# Patient Record
Sex: FEMALE | Race: WHITE | NOT HISPANIC OR LATINO
[De-identification: names, ages, dates, MRNs, and addresses within clinical notes are randomized per-mention and may not be internally consistent; named-entity substitution may affect disease eponyms.]

---

## 2019-05-08 ENCOUNTER — APPOINTMENT (OUTPATIENT)
Dept: FAMILY MEDICINE | Facility: CLINIC | Age: 39
End: 2019-05-08
Payer: COMMERCIAL

## 2019-05-08 VITALS
HEART RATE: 69 BPM | RESPIRATION RATE: 16 BRPM | WEIGHT: 141 LBS | DIASTOLIC BLOOD PRESSURE: 64 MMHG | SYSTOLIC BLOOD PRESSURE: 96 MMHG | OXYGEN SATURATION: 99 % | BODY MASS INDEX: 22.66 KG/M2 | HEIGHT: 66 IN

## 2019-05-08 DIAGNOSIS — Z82.49 FAMILY HISTORY OF ISCHEMIC HEART DISEASE AND OTHER DISEASES OF THE CIRCULATORY SYSTEM: ICD-10-CM

## 2019-05-08 DIAGNOSIS — F98.8 OTHER SPECIFIED BEHAVIORAL AND EMOTIONAL DISORDERS WITH ONSET USUALLY OCCURRING IN CHILDHOOD AND ADOLESCENCE: ICD-10-CM

## 2019-05-08 LAB — CYTOLOGY CVX/VAG DOC THIN PREP: NORMAL

## 2019-05-08 PROCEDURE — 99203 OFFICE O/P NEW LOW 30 MIN: CPT

## 2019-05-08 NOTE — HISTORY OF PRESENT ILLNESS
[de-identified] : 38 year old female here to establish care and for complaints of ADD\par Patient with long history of ADD, used to be treated successfully with Ritalin, however cannot find neurologist, so will take care over care. The patients complete medical, family and social history was documented and reviewed with the patient.  The patients medications were identified and also reviewed with the patient as well as any allergies to any medications. All active and previous medical problems were identified and discussed with the patient.

## 2019-05-08 NOTE — ASSESSMENT
[FreeTextEntry1] : Discussed diagnosis of ADD with patient. Patient was assessed using verbal scale and in depth discussion of behaviors and how they are impacting daily life. Discussed all side affects/pros/cons of medications and black box warnings. Patient was educated on addictive potential of medications.  Patient verbalized understanding and will take medications as prescribed.  All questions were answered.\par \par was controlled well on ritalin in past \par will take over care\par \par

## 2019-05-08 NOTE — HEALTH RISK ASSESSMENT
[Good] : ~his/her~  mood as  good [No falls in past year] : Patient reported no falls in the past year [0] : 1) Little interest or pleasure doing things: Not at all (0) [Hepatitis C test declined] : Hepatitis C test declined [HIV test declined] : HIV test declined [With Family] : lives with family [Employed] : employed [With Significant Other] : lives with significant other [# Of Children ___] : has [unfilled] children [] :  [Fully functional (bathing, dressing, toileting, transferring, walking, feeding)] : Fully functional (bathing, dressing, toileting, transferring, walking, feeding) [Smoke Detector] : smoke detector [Fully functional (using the telephone, shopping, preparing meals, housekeeping, doing laundry, using] : Fully functional and needs no help or supervision to perform IADLs (using the telephone, shopping, preparing meals, housekeeping, doing laundry, using transportation, managing medications and managing finances) [Seat Belt] :  uses seat belt [] : No [[id number]] : 0

## 2020-01-02 ENCOUNTER — APPOINTMENT (OUTPATIENT)
Dept: FAMILY MEDICINE | Facility: CLINIC | Age: 40
End: 2020-01-02
Payer: COMMERCIAL

## 2020-01-02 VITALS
RESPIRATION RATE: 18 BRPM | WEIGHT: 145 LBS | OXYGEN SATURATION: 98 % | HEIGHT: 66 IN | SYSTOLIC BLOOD PRESSURE: 84 MMHG | DIASTOLIC BLOOD PRESSURE: 52 MMHG | HEART RATE: 93 BPM | BODY MASS INDEX: 23.3 KG/M2

## 2020-01-02 PROCEDURE — 99213 OFFICE O/P EST LOW 20 MIN: CPT

## 2020-01-06 ENCOUNTER — APPOINTMENT (OUTPATIENT)
Dept: FAMILY MEDICINE | Facility: CLINIC | Age: 40
End: 2020-01-06
Payer: COMMERCIAL

## 2020-01-06 VITALS
SYSTOLIC BLOOD PRESSURE: 102 MMHG | TEMPERATURE: 98.4 F | OXYGEN SATURATION: 99 % | HEART RATE: 69 BPM | DIASTOLIC BLOOD PRESSURE: 70 MMHG

## 2020-01-06 LAB — CYTOLOGY CVX/VAG DOC THIN PREP: NORMAL

## 2020-01-06 PROCEDURE — 99213 OFFICE O/P EST LOW 20 MIN: CPT

## 2020-01-06 NOTE — PHYSICAL EXAM
[Well Nourished] : well nourished [Clear to Auscultation] : lungs were clear to auscultation bilaterally [Regular Rhythm] : with a regular rhythm [Normal S1, S2] : normal S1 and S2 [Normal Affect] : the affect was normal [Normal Gait] : normal gait [Normal Insight/Judgement] : insight and judgment were intact

## 2020-01-06 NOTE — ASSESSMENT
[FreeTextEntry1] : uri\par failed azithromyizin\par was given azithromycin, prednisone and omeprazole with no issues\par singulair\par flonase\par promethazine dm\par augmentin\par \par Discussed diagnosis of ADD with patient. Patient was assessed using verbal scale and in depth discussion of behaviors and how they are impacting daily life. Discussed all side affects/pros/cons of medications and black box warnings. Patient was educated on addictive potential of medications.  Patient verbalized understanding and will take medications as prescribed.  All questions were answered.\par \par was controlled well on ritalin in past \par will take over care\par \par

## 2020-01-06 NOTE — HISTORY OF PRESENT ILLNESS
[de-identified] : 39 year old female here with complaints of cough, congestion\par was given prednisone and omeprazole, still coughing\par . Patients active medications, allergies and issues were all reviewed with the patient at time of visit.\par

## 2020-01-06 NOTE — HEALTH RISK ASSESSMENT
[No falls in past year] : Patient reported no falls in the past year [] : No [0] : 1) Little interest or pleasure doing things: Not at all (0) [[id number]] : 0 [Good] : ~his/her~  mood as  good [HIV test declined] : HIV test declined [Hepatitis C test declined] : Hepatitis C test declined [With Family] : lives with family [With Significant Other] : lives with significant other [Employed] : employed [# Of Children ___] : has [unfilled] children [] :  [Fully functional (bathing, dressing, toileting, transferring, walking, feeding)] : Fully functional (bathing, dressing, toileting, transferring, walking, feeding) [Smoke Detector] : smoke detector [Fully functional (using the telephone, shopping, preparing meals, housekeeping, doing laundry, using] : Fully functional and needs no help or supervision to perform IADLs (using the telephone, shopping, preparing meals, housekeeping, doing laundry, using transportation, managing medications and managing finances) [Seat Belt] :  uses seat belt

## 2020-01-25 ENCOUNTER — RX RENEWAL (OUTPATIENT)
Age: 40
End: 2020-01-25

## 2020-04-07 ENCOUNTER — APPOINTMENT (OUTPATIENT)
Dept: FAMILY MEDICINE | Facility: CLINIC | Age: 40
End: 2020-04-07
Payer: COMMERCIAL

## 2020-04-07 VITALS
HEIGHT: 66 IN | OXYGEN SATURATION: 98 % | DIASTOLIC BLOOD PRESSURE: 68 MMHG | SYSTOLIC BLOOD PRESSURE: 100 MMHG | HEART RATE: 88 BPM

## 2020-04-07 PROCEDURE — 99213 OFFICE O/P EST LOW 20 MIN: CPT | Mod: 25

## 2020-04-07 PROCEDURE — 36415 COLL VENOUS BLD VENIPUNCTURE: CPT

## 2020-04-07 RX ORDER — AZITHROMYCIN 250 MG/1
250 TABLET, FILM COATED ORAL
Qty: 6 | Refills: 0 | Status: COMPLETED | COMMUNITY
Start: 2019-12-30

## 2020-04-07 RX ORDER — AMOXICILLIN AND CLAVULANATE POTASSIUM 875; 125 MG/1; MG/1
875-125 TABLET, COATED ORAL
Qty: 20 | Refills: 0 | Status: DISCONTINUED | COMMUNITY
Start: 2020-01-06 | End: 2020-04-07

## 2020-04-07 RX ORDER — PREDNISONE 10 MG/1
10 TABLET ORAL
Qty: 10 | Refills: 0 | Status: COMPLETED | COMMUNITY
Start: 2019-12-30

## 2020-04-07 NOTE — PHYSICAL EXAM
[Well Nourished] : well nourished [Clear to Auscultation] : lungs were clear to auscultation bilaterally [Regular Rhythm] : with a regular rhythm [Normal S1, S2] : normal S1 and S2 [Normal Gait] : normal gait [Normal Affect] : the affect was normal [Normal Insight/Judgement] : insight and judgment were intact [de-identified] : palpable nodule on right side of throat on thyroid

## 2020-04-07 NOTE — ASSESSMENT
[FreeTextEntry1] : thyroid nodule\par sent for us, reviewed with patient\par 3 cm dominant nodule\par sending for us guided biopsy\par will make appointment\par \par check thyroid levels\par

## 2020-04-07 NOTE — HEALTH RISK ASSESSMENT
[No falls in past year] : Patient reported no falls in the past year [0] : 2) Feeling down, depressed, or hopeless: Not at all (0) [Good] : ~his/her~  mood as  good [HIV test declined] : HIV test declined [Hepatitis C test declined] : Hepatitis C test declined [With Significant Other] : lives with significant other [With Family] : lives with family [Employed] : employed [] :  [# Of Children ___] : has [unfilled] children [Fully functional (bathing, dressing, toileting, transferring, walking, feeding)] : Fully functional (bathing, dressing, toileting, transferring, walking, feeding) [Fully functional (using the telephone, shopping, preparing meals, housekeeping, doing laundry, using] : Fully functional and needs no help or supervision to perform IADLs (using the telephone, shopping, preparing meals, housekeeping, doing laundry, using transportation, managing medications and managing finances) [Smoke Detector] : smoke detector [Seat Belt] :  uses seat belt [] : No [[id number]] : 0

## 2020-04-08 LAB
T3 SERPL-MCNC: 95 NG/DL
T4 FREE SERPL-MCNC: 1.2 NG/DL
TSH SERPL-ACNC: 1.72 UIU/ML

## 2020-04-09 ENCOUNTER — OUTPATIENT (OUTPATIENT)
Dept: OUTPATIENT SERVICES | Facility: HOSPITAL | Age: 40
LOS: 1 days | End: 2020-04-09
Payer: COMMERCIAL

## 2020-04-09 ENCOUNTER — RESULT REVIEW (OUTPATIENT)
Age: 40
End: 2020-04-09

## 2020-04-09 ENCOUNTER — APPOINTMENT (OUTPATIENT)
Dept: ULTRASOUND IMAGING | Facility: IMAGING CENTER | Age: 40
End: 2020-04-09
Payer: COMMERCIAL

## 2020-04-09 DIAGNOSIS — E04.1 NONTOXIC SINGLE THYROID NODULE: ICD-10-CM

## 2020-04-09 PROCEDURE — 88172 CYTP DX EVAL FNA 1ST EA SITE: CPT

## 2020-04-09 PROCEDURE — 10005 FNA BX W/US GDN 1ST LES: CPT

## 2020-04-09 PROCEDURE — 88173 CYTOPATH EVAL FNA REPORT: CPT | Mod: 26

## 2020-04-09 PROCEDURE — 88173 CYTOPATH EVAL FNA REPORT: CPT

## 2020-04-22 ENCOUNTER — OUTPATIENT (OUTPATIENT)
Dept: OUTPATIENT SERVICES | Facility: HOSPITAL | Age: 40
LOS: 1 days | End: 2020-04-22
Payer: COMMERCIAL

## 2020-04-22 ENCOUNTER — APPOINTMENT (OUTPATIENT)
Dept: ULTRASOUND IMAGING | Facility: IMAGING CENTER | Age: 40
End: 2020-04-22
Payer: COMMERCIAL

## 2020-04-22 DIAGNOSIS — C80.1 MALIGNANT (PRIMARY) NEOPLASM, UNSPECIFIED: ICD-10-CM

## 2020-04-22 PROCEDURE — 76536 US EXAM OF HEAD AND NECK: CPT

## 2020-04-22 PROCEDURE — 76536 US EXAM OF HEAD AND NECK: CPT | Mod: 26

## 2020-04-24 ENCOUNTER — APPOINTMENT (OUTPATIENT)
Dept: ENDOCRINOLOGY | Facility: CLINIC | Age: 40
End: 2020-04-24
Payer: COMMERCIAL

## 2020-04-24 PROCEDURE — 99204 OFFICE O/P NEW MOD 45 MIN: CPT | Mod: 95

## 2020-04-24 NOTE — CONSULT LETTER
[Dear  ___] : Dear  [unfilled], [Sincerely,] : Sincerely, [FreeTextEntry3] : Valerie Juarez MD, FACE, ECNU\par  [FreeTextEntry1] : Thank you for referring  Ms. RENU ARNETT to me for evaluation and treatment. Please, see attached consultation note. As always, if there are specific questions you would like to discuss, please feel free to contact me.\par Thank you for the courtesy of this evaluation.\par

## 2020-04-24 NOTE — HISTORY OF PRESENT ILLNESS
[Medical Office: ([facility])___] : at the medical office located in  [Home] : at home, [unfilled] , at the time of the visit. [Patient] : the patient [Self] : self [FreeTextEntry1] : 39 year female  referred for management of thyroid cancer. \par Mrs Hobson has accidentally discovered a "lump" on the right side of her neck. Subsequent thyroid sonogram showed a dominant right sided nodule. FNA (+) PTC.\par She denies history of neck surgery or radiation exposure to the neck area other than radiologic studies. \par Family history is negative for thyroid illness. \par Currently, there are no local neck symptoms of anterior neck pain or problems with breathing, speaking, or swallowing. \par Patient denies symptoms of hypothyroidism or hyperthyroidism. \par Labs: TSH- 1.7\par Thyroid US (4/7/20)- RMLP solid calcified 3.0x1.3x2.1 nodule, RUP 0.3x0.2x0.3\par FNAB (4/9/20) of the dominant right sided nodule - PTC ([place] [facility])\par \par Repeat Neck US (4/22/20) - bilateral level 2-3 LN with overall benign morphology\par \par

## 2020-04-24 NOTE — REASON FOR VISIT
[Thyroid Cancer] : thyroid cancer [Consultation] : a consultation visit [Thyroid nodule/ MNG] : thyroid nodule/ MNG

## 2020-04-24 NOTE — ASSESSMENT
[FreeTextEntry1] : Current approaches to thyroid cancer management were discussed with patient in details. \par - patient will see Dr. Vargas next week. \par - we discussed options for lobectomy vs total thyroidectomy, but at present, I'd advocate a total thyroidectomy +/- central node dissection\par - We discussed possible need  for Whole Body Scan (rh-TSH vs THW) about 4-6 weeks post surgery; that might follow by I-131 remnant ablation therapy\par - Low-iodine diet is reviewed and [email or web address] information is provided \par - Levothyroxine suppressive therapy as well as Tg and Tg a/b monitoring were reviewed \par - Thyroid bed/anterior neck US ~ 6 months post surgery \par RTC ~ 2-3 weeks post surgery\par \par

## 2020-04-29 ENCOUNTER — TRANSCRIPTION ENCOUNTER (OUTPATIENT)
Age: 40
End: 2020-04-29

## 2020-04-29 ENCOUNTER — APPOINTMENT (OUTPATIENT)
Dept: SURGERY | Facility: CLINIC | Age: 40
End: 2020-04-29
Payer: COMMERCIAL

## 2020-04-29 DIAGNOSIS — C80.1 MALIGNANT (PRIMARY) NEOPLASM, UNSPECIFIED: ICD-10-CM

## 2020-04-29 PROCEDURE — 99203 OFFICE O/P NEW LOW 30 MIN: CPT | Mod: 95

## 2020-04-29 NOTE — PHYSICAL EXAM
[Midline] : located in midline position [de-identified] : Extremities: VILLALOBOS x 4.   Skin: No obvious skin lesions.   Voice: clear [Normal] : orientation to person, place, and time: normal

## 2020-04-29 NOTE — REASON FOR VISIT
[Initial Consultation] : an initial consultation for [FreeTextEntry2] : a newly diagnosed thyroid cancer

## 2020-05-26 ENCOUNTER — NON-APPOINTMENT (OUTPATIENT)
Age: 40
End: 2020-05-26

## 2020-05-29 ENCOUNTER — RESULT REVIEW (OUTPATIENT)
Age: 40
End: 2020-05-29

## 2020-05-29 ENCOUNTER — OUTPATIENT (OUTPATIENT)
Dept: OUTPATIENT SERVICES | Facility: HOSPITAL | Age: 40
LOS: 1 days | End: 2020-05-29
Payer: COMMERCIAL

## 2020-05-29 VITALS
WEIGHT: 145.06 LBS | RESPIRATION RATE: 14 BRPM | TEMPERATURE: 99 F | OXYGEN SATURATION: 97 % | HEIGHT: 66 IN | SYSTOLIC BLOOD PRESSURE: 111 MMHG | DIASTOLIC BLOOD PRESSURE: 72 MMHG | HEART RATE: 76 BPM

## 2020-05-29 DIAGNOSIS — Z01.818 ENCOUNTER FOR OTHER PREPROCEDURAL EXAMINATION: ICD-10-CM

## 2020-05-29 DIAGNOSIS — C73 MALIGNANT NEOPLASM OF THYROID GLAND: ICD-10-CM

## 2020-05-29 PROCEDURE — 88321 CONSLTJ&REPRT SLD PREP ELSWR: CPT

## 2020-05-29 NOTE — H&P PST ADULT - ASSESSMENT
40 yo female scheduled for Total Thyroidectomy w/ Central Neck Dissection - [facility] Monitor on 6/4/20 with Dr Vargas.

## 2020-05-29 NOTE — H&P PST ADULT - CONSTITUTIONAL COMMENTS
Denies any changes in her health since she had telephone history interview - COVID-19 Swab obtained on 6/3/2020= negative

## 2020-05-29 NOTE — H&P PST ADULT - NEGATIVE ENMT SYMPTOMS
no nasal congestion/no throat pain/no dysphagia/no ear pain/no tinnitus/no nasal discharge/no vertigo/no sinus symptoms

## 2020-05-29 NOTE — H&P PST ADULT - NEGATIVE NEUROLOGICAL SYMPTOMS
no generalized seizures/no paresthesias/no vertigo/no focal seizures/no loss of sensation/no weakness

## 2020-05-29 NOTE — H&P PST ADULT - ATTENDING COMMENTS
ADDENDUM 6/4/2020:  Due to COVID-19 protocol;  Pt seen in holding room for physical exam /completion of PST H&P. Pt awake, alert and oriented X 3. VS as charted. Procedure verified with patient.  COVID-19 Swab obtained on 6/3/2020 = Non Detected.  Pt denies any changes in her health since  telephone interview. Physical exam as charted. Medical clearance on chart. Pt optimized to proceed for scheduled procedure; Total Thyroidectomy W/Central Neck Dissection - [facility] Monitor with Dr Adin Vargas. Sunni Friend ANP-C

## 2020-05-29 NOTE — H&P PST ADULT - NSICDXPASTMEDICALHX_GEN_ALL_CORE_FT
PAST MEDICAL HISTORY:  Acute asthmatic bronchitis November 2019 - January 2020    Asthmatic bronchitis     Attention deficit disorder (ADD) in adult     Malignant neoplasm of thyroid gland     Ocular migraine last episode 5/28/20  previous very long time ago    Thyroid nodule

## 2020-05-29 NOTE — H&P PST ADULT - NSANTHOSAYNRD_GEN_A_CORE
No. CHACHO screening performed.  STOP BANG Legend: 0-2 = LOW Risk; 3-4 = INTERMEDIATE Risk; 5-8 = HIGH Risk

## 2020-05-29 NOTE — H&P PST ADULT - HISTORY OF PRESENT ILLNESS
38 yo female [facility].  Patient states that in April 2020 she had an abnormal neck exam at her doctor's office.   Patient had an Ultrasound and Biopsy of Thyroid, followed up with Endocrinologist and surgeon. Neck lump is currently unchanged in size.

## 2020-05-29 NOTE — H&P PST ADULT - BLOOD AVOIDANCE/RESTRICTIONS, PROFILE
Former smoker quit about 15 years ago.  At least two episodes of hemoptysis over the last two days.  Consult Pulmonary.  CT chest/abdomen concerning for metastatic disease (new)     none

## 2020-05-29 NOTE — H&P PST ADULT - NSICDXPROBLEM_GEN_ALL_CORE_FT
PROBLEM DIAGNOSES  Problem: Malignant neoplasm of thyroid gland  Assessment and Plan: check labs cbcc cmp pt ptt ucg and ekg  discussed with pt hat she needs to have lab tests and ekg done ant her doctor's office have results faxed to [facility]   medical clearance  called Dr Martin office s/w [facility]  requested that blood work cbc cmp pt ptt hcg and ekg be done at the doctor's office with results faxed to [facility]  patient is aware that she will need to have covid swab test done at [facility],  [place] will call for an appointment   preop instructions were given  pativerbalizes understanding of instructions PROBLEM DIAGNOSES  Problem: Malignant neoplasm of thyroid gland  Assessment and Plan: Due to COVID-19 protocol PST History obtained via telephone - medical clearance scheduled - pt to have blood work done by PCP - COVID-19 swab to be done at [facility] on 6/3/2020. Physical exam  for completion of H&P will be obtained in holding room prior to procedure. Pre-op instructions given to pt with understanding verbalized

## 2020-06-01 PROBLEM — C73 MALIGNANT NEOPLASM OF THYROID GLAND: Chronic | Status: ACTIVE | Noted: 2020-05-29

## 2020-06-01 PROBLEM — J45.909 UNSPECIFIED ASTHMA, UNCOMPLICATED: Chronic | Status: ACTIVE | Noted: 2020-05-29

## 2020-06-01 PROBLEM — F98.8 OTHER SPECIFIED BEHAVIORAL AND EMOTIONAL DISORDERS WITH ONSET USUALLY OCCURRING IN CHILDHOOD AND ADOLESCENCE: Chronic | Status: ACTIVE | Noted: 2020-05-29

## 2020-06-01 PROBLEM — E04.1 NONTOXIC SINGLE THYROID NODULE: Chronic | Status: ACTIVE | Noted: 2020-05-29

## 2020-06-01 PROBLEM — G43.109 MIGRAINE WITH AURA, NOT INTRACTABLE, WITHOUT STATUS MIGRAINOSUS: Chronic | Status: ACTIVE | Noted: 2020-05-29

## 2020-06-02 ENCOUNTER — APPOINTMENT (OUTPATIENT)
Dept: FAMILY MEDICINE | Facility: CLINIC | Age: 40
End: 2020-06-02
Payer: COMMERCIAL

## 2020-06-02 ENCOUNTER — LABORATORY RESULT (OUTPATIENT)
Age: 40
End: 2020-06-02

## 2020-06-02 ENCOUNTER — NON-APPOINTMENT (OUTPATIENT)
Age: 40
End: 2020-06-02

## 2020-06-02 VITALS
BODY MASS INDEX: 23.3 KG/M2 | DIASTOLIC BLOOD PRESSURE: 68 MMHG | HEART RATE: 73 BPM | SYSTOLIC BLOOD PRESSURE: 100 MMHG | OXYGEN SATURATION: 98 % | HEIGHT: 66 IN | WEIGHT: 145 LBS

## 2020-06-02 DIAGNOSIS — Z01.818 ENCOUNTER FOR OTHER PREPROCEDURAL EXAMINATION: ICD-10-CM

## 2020-06-02 DIAGNOSIS — Z11.59 ENCOUNTER FOR SCREENING FOR OTHER VIRAL DISEASES: ICD-10-CM

## 2020-06-02 PROCEDURE — 93000 ELECTROCARDIOGRAM COMPLETE: CPT

## 2020-06-02 PROCEDURE — 99214 OFFICE O/P EST MOD 30 MIN: CPT | Mod: 25

## 2020-06-02 PROCEDURE — 36415 COLL VENOUS BLD VENIPUNCTURE: CPT

## 2020-06-02 RX ORDER — PREDNISONE 20 MG/1
20 TABLET ORAL
Qty: 18 | Refills: 0 | Status: DISCONTINUED | COMMUNITY
Start: 2020-01-02 | End: 2020-06-02

## 2020-06-02 RX ORDER — PROMETHAZINE HYDROCHLORIDE AND DEXTROMETHORPHAN HYDROBROMIDE ORAL SOLUTION 15; 6.25 MG/5ML; MG/5ML
6.25-15 SOLUTION ORAL
Qty: 150 | Refills: 0 | Status: DISCONTINUED | COMMUNITY
Start: 2020-01-06 | End: 2020-06-02

## 2020-06-02 RX ORDER — METHYLPHENIDATE HYDROCHLORIDE 10 MG/1
10 CAPSULE, EXTENDED RELEASE ORAL
Qty: 30 | Refills: 0 | Status: DISCONTINUED | COMMUNITY
Start: 2019-05-08 | End: 2020-06-02

## 2020-06-03 ENCOUNTER — OUTPATIENT (OUTPATIENT)
Dept: OUTPATIENT SERVICES | Facility: HOSPITAL | Age: 40
LOS: 1 days | End: 2020-06-03
Payer: COMMERCIAL

## 2020-06-03 ENCOUNTER — TRANSCRIPTION ENCOUNTER (OUTPATIENT)
Age: 40
End: 2020-06-03

## 2020-06-03 DIAGNOSIS — C73 MALIGNANT NEOPLASM OF THYROID GLAND: ICD-10-CM

## 2020-06-03 DIAGNOSIS — Z11.59 ENCOUNTER FOR SCREENING FOR OTHER VIRAL DISEASES: ICD-10-CM

## 2020-06-03 LAB
ALBUMIN SERPL ELPH-MCNC: 4.5 G/DL
ALP BLD-CCNC: 88 U/L
ALT SERPL-CCNC: 15 U/L
ANION GAP SERPL CALC-SCNC: 14 MMOL/L
APPEARANCE: ABNORMAL
APTT BLD: 28.4 SEC
AST SERPL-CCNC: 14 U/L
BASOPHILS # BLD AUTO: 0.07 K/UL
BASOPHILS NFR BLD AUTO: 1.2 %
BILIRUB SERPL-MCNC: <0.2 MG/DL
BILIRUBIN URINE: NEGATIVE
BLOOD URINE: NEGATIVE
BUN SERPL-MCNC: 10 MG/DL
CALCIUM SERPL-MCNC: 9.4 MG/DL
CHLORIDE SERPL-SCNC: 105 MMOL/L
CO2 SERPL-SCNC: 21 MMOL/L
COLOR: YELLOW
CREAT SERPL-MCNC: 0.74 MG/DL
EOSINOPHIL # BLD AUTO: 0.11 K/UL
EOSINOPHIL NFR BLD AUTO: 1.9 %
GLUCOSE QUALITATIVE U: NEGATIVE
GLUCOSE SERPL-MCNC: 73 MG/DL
HCG SERPL-MCNC: <1 MIU/ML
HCT VFR BLD CALC: 40.5 %
HGB BLD-MCNC: 13.4 G/DL
IMM GRANULOCYTES NFR BLD AUTO: 0.2 %
INR PPP: 0.95 RATIO
KETONES URINE: NORMAL
LEUKOCYTE ESTERASE URINE: ABNORMAL
LYMPHOCYTES # BLD AUTO: 2.15 K/UL
LYMPHOCYTES NFR BLD AUTO: 36.8 %
MAN DIFF?: NORMAL
MCHC RBC-ENTMCNC: 30.4 PG
MCHC RBC-ENTMCNC: 33.1 GM/DL
MCV RBC AUTO: 91.8 FL
MONOCYTES # BLD AUTO: 0.61 K/UL
MONOCYTES NFR BLD AUTO: 10.4 %
NEUTROPHILS # BLD AUTO: 2.9 K/UL
NEUTROPHILS NFR BLD AUTO: 49.5 %
NITRITE URINE: NEGATIVE
PH URINE: 5.5
PLATELET # BLD AUTO: 248 K/UL
POTASSIUM SERPL-SCNC: 3.9 MMOL/L
PROT SERPL-MCNC: 6.7 G/DL
PROTEIN URINE: NORMAL
PT BLD: 10.9 SEC
RBC # BLD: 4.41 M/UL
RBC # FLD: 12.5 %
SARS-COV-2 IGG SERPL IA-ACNC: 0.1 INDEX
SARS-COV-2 IGG SERPL QL IA: NEGATIVE
SARS-COV-2 RNA SPEC QL NAA+PROBE: SIGNIFICANT CHANGE UP
SODIUM SERPL-SCNC: 140 MMOL/L
SPECIFIC GRAVITY URINE: 1.03
UROBILINOGEN URINE: NORMAL
WBC # FLD AUTO: 5.85 K/UL

## 2020-06-03 PROCEDURE — 87635 SARS-COV-2 COVID-19 AMP PRB: CPT

## 2020-06-03 NOTE — ASU PATIENT PROFILE, ADULT - PMH
Acute asthmatic bronchitis  November 2019 - January 2020  Asthmatic bronchitis    Attention deficit disorder (ADD) in adult    Malignant neoplasm of thyroid gland    Ocular migraine  last episode 5/28/20  previous very long time ago  Thyroid nodule

## 2020-06-03 NOTE — HISTORY OF PRESENT ILLNESS
[No Pertinent Cardiac History] : no history of aortic stenosis, atrial fibrillation, coronary artery disease, recent myocardial infarction, or implantable device/pacemaker [No Pertinent Pulmonary History] : no history of asthma, COPD, sleep apnea, or smoking [No Adverse Anesthesia Reaction] : no adverse anesthesia reaction in self or family member [(Patient denies any chest pain, claudication, dyspnea on exertion, orthopnea, palpitations or syncope)] : Patient denies any chest pain, claudication, dyspnea on exertion, orthopnea, palpitations or syncope [Chronic Anticoagulation] : no chronic anticoagulation [Chronic Kidney Disease] : no chronic kidney disease [Diabetes] : no diabetes [FreeTextEntry1] : thyroid cancer removal [FreeTextEntry2] : 6/4/2020 [FreeTextEntry3] : Dr. Vargas [FreeTextEntry4] : 39 year old female  is here for medical clearance for an upcoming surgery for removal of known thyroid cancer.  All medical problems and medicines were documented and reviewed with the patient. \par Patient was counseled to stop any advil/alieve/aspirin 7 days prior to surgery and was advised to have nothing by mouth from 11 pm the night prior to surgery. \par Medications were reviewed with patient and patient was directed on which medications to be taken and not to be taken prior to surgery.\par \par

## 2020-06-03 NOTE — REVIEW OF SYSTEMS
[Insomnia] : insomnia [Anxiety] : anxiety [Negative] : Heme/Lymph [de-identified] : chronic insomnia and situational anxiety

## 2020-06-03 NOTE — ASSESSMENT
[As per surgery] : as per surgery [Patient Optimized for Surgery] : Patient optimized for surgery [No Further Testing Recommended] : no further testing recommended [FreeTextEntry4] : 39 year old female  is here for medical clearance for an upcoming surgery for removal of known thyroid cancer.  All medical problems and medicines were documented and reviewed with the patient. \par Patient was counseled to stop any advil/alieve/aspirin 7 days prior to surgery and was advised to have nothing by mouth from 11 pm the night prior to surgery. \par Medications were reviewed with patient and patient was directed on which medications to be taken and not to be taken prior to surgery.\par \par patient very anxious will give xanax as needed for sleep and for current situation, addictive potential reviewed

## 2020-06-04 ENCOUNTER — APPOINTMENT (OUTPATIENT)
Dept: SURGERY | Facility: HOSPITAL | Age: 40
End: 2020-06-04

## 2020-06-04 ENCOUNTER — OUTPATIENT (OUTPATIENT)
Dept: OUTPATIENT SERVICES | Facility: HOSPITAL | Age: 40
LOS: 1 days | End: 2020-06-04
Payer: COMMERCIAL

## 2020-06-04 ENCOUNTER — RESULT REVIEW (OUTPATIENT)
Age: 40
End: 2020-06-04

## 2020-06-04 VITALS
HEART RATE: 78 BPM | OXYGEN SATURATION: 100 % | DIASTOLIC BLOOD PRESSURE: 60 MMHG | TEMPERATURE: 99 F | SYSTOLIC BLOOD PRESSURE: 104 MMHG | RESPIRATION RATE: 14 BRPM

## 2020-06-04 VITALS
OXYGEN SATURATION: 95 % | DIASTOLIC BLOOD PRESSURE: 72 MMHG | HEART RATE: 73 BPM | RESPIRATION RATE: 16 BRPM | SYSTOLIC BLOOD PRESSURE: 111 MMHG | TEMPERATURE: 99 F | HEIGHT: 66 IN | WEIGHT: 145.06 LBS

## 2020-06-04 DIAGNOSIS — C73 MALIGNANT NEOPLASM OF THYROID GLAND: ICD-10-CM

## 2020-06-04 DIAGNOSIS — Z86.39 PERSONAL HISTORY OF OTHER ENDOCRINE, NUTRITIONAL AND METABOLIC DISEASE: ICD-10-CM

## 2020-06-04 PROCEDURE — C1889: CPT

## 2020-06-04 PROCEDURE — 60252 REMOVAL OF THYROID: CPT

## 2020-06-04 PROCEDURE — 60252 REMOVAL OF THYROID: CPT | Mod: AS

## 2020-06-04 PROCEDURE — 36415 COLL VENOUS BLD VENIPUNCTURE: CPT

## 2020-06-04 PROCEDURE — 88307 TISSUE EXAM BY PATHOLOGIST: CPT

## 2020-06-04 PROCEDURE — 86850 RBC ANTIBODY SCREEN: CPT

## 2020-06-04 PROCEDURE — 86901 BLOOD TYPING SEROLOGIC RH(D): CPT

## 2020-06-04 PROCEDURE — 88307 TISSUE EXAM BY PATHOLOGIST: CPT | Mod: 26

## 2020-06-04 PROCEDURE — 86900 BLOOD TYPING SEROLOGIC ABO: CPT

## 2020-06-04 PROCEDURE — 95868 NDL EMG CRANIAL NRV MUSC BI: CPT | Mod: 26,59

## 2020-06-04 RX ORDER — ONDANSETRON 8 MG/1
4 TABLET, FILM COATED ORAL ONCE
Refills: 0 | Status: DISCONTINUED | OUTPATIENT
Start: 2020-06-04 | End: 2020-06-05

## 2020-06-04 RX ORDER — LEVOTHYROXINE SODIUM 125 MCG
1 TABLET ORAL
Qty: 30 | Refills: 1
Start: 2020-06-04 | End: 2020-08-02

## 2020-06-04 RX ORDER — SODIUM CHLORIDE 9 MG/ML
1000 INJECTION, SOLUTION INTRAVENOUS
Refills: 0 | Status: DISCONTINUED | OUTPATIENT
Start: 2020-06-04 | End: 2020-06-05

## 2020-06-04 RX ORDER — CEFAZOLIN SODIUM 1 G
2000 VIAL (EA) INJECTION ONCE
Refills: 0 | Status: COMPLETED | OUTPATIENT
Start: 2020-06-04 | End: 2020-06-04

## 2020-06-04 RX ORDER — CALCIUM CARBONATE 500(1250)
3 TABLET ORAL ONCE
Refills: 0 | Status: DISCONTINUED | OUTPATIENT
Start: 2020-06-04 | End: 2020-06-04

## 2020-06-04 RX ORDER — OXYCODONE HYDROCHLORIDE 5 MG/1
5 TABLET ORAL ONCE
Refills: 0 | Status: DISCONTINUED | OUTPATIENT
Start: 2020-06-04 | End: 2020-06-04

## 2020-06-04 RX ORDER — SODIUM CHLORIDE 9 MG/ML
1000 INJECTION, SOLUTION INTRAVENOUS
Refills: 0 | Status: DISCONTINUED | OUTPATIENT
Start: 2020-06-04 | End: 2020-06-04

## 2020-06-04 RX ORDER — ALPRAZOLAM 0.25 MG
1 TABLET ORAL
Qty: 0 | Refills: 0 | DISCHARGE

## 2020-06-04 RX ORDER — HYDROMORPHONE HYDROCHLORIDE 2 MG/ML
0.5 INJECTION INTRAMUSCULAR; INTRAVENOUS; SUBCUTANEOUS
Refills: 0 | Status: DISCONTINUED | OUTPATIENT
Start: 2020-06-04 | End: 2020-06-05

## 2020-06-04 RX ORDER — MULTIVIT-MIN/FERROUS GLUCONATE 9 MG/15 ML
0 LIQUID (ML) ORAL
Qty: 0 | Refills: 0 | DISCHARGE

## 2020-06-04 RX ADMIN — HYDROMORPHONE HYDROCHLORIDE 0.5 MILLIGRAM(S): 2 INJECTION INTRAMUSCULAR; INTRAVENOUS; SUBCUTANEOUS at 16:17

## 2020-06-04 RX ADMIN — SODIUM CHLORIDE 50 MILLILITER(S): 9 INJECTION, SOLUTION INTRAVENOUS at 17:05

## 2020-06-04 RX ADMIN — OXYCODONE HYDROCHLORIDE 5 MILLIGRAM(S): 5 TABLET ORAL at 18:50

## 2020-06-04 RX ADMIN — SODIUM CHLORIDE 50 MILLILITER(S): 9 INJECTION, SOLUTION INTRAVENOUS at 10:27

## 2020-06-04 RX ADMIN — Medication 2 TABLET(S): at 19:05

## 2020-06-04 NOTE — ASU DISCHARGE PLAN (ADULT/PEDIATRIC) - CARE PROVIDER_API CALL
Adin Vargas  SURGERY  [address] [place] [address]  Phone: [phone]  Fax: [phone]  Follow Up Time:

## 2020-06-04 NOTE — BRIEF OPERATIVE NOTE - NSICDXBRIEFPROCEDURE_GEN_ALL_CORE_FT
PROCEDURES:  Dissection, neck, selective 04-Jun-2020 16:49:00 central neck dissection Madiha Webb  Total thyroidectomy 04-Jun-2020 16:48:11  Madiha Webb

## 2020-06-04 NOTE — PROGRESS NOTE ADULT - SUBJECTIVE AND OBJECTIVE BOX
RENU ARNETT  MRN-[id number] [id number]    ENDOCRINE SURGERY/ DR. ELLIS    C/O SORE THROAT  NO ODYNOPHAGIA, DYSPHASIA, DYSPNEA, SOB  TOLERATING REGULAR DIET     MEDICATIONS  (STANDING):  lactated ringers. 1000 milliLiter(s) (50 mL/Hr) IV Continuous <Continuous>    MEDICATIONS  (PRN):  HYDROmorphone  Injectable 0.5 milliGRAM(s) IV Push every 10 minutes PRN Severe Pain (7 - 10)  ondansetron Injectable 4 milliGRAM(s) IV Push Once PRN Nausea and/or Vomiting      Vital Signs Last 24 Hrs  T(C): 36.7 (04 Jun 2020 21:50), Max: 37.7 (04 Jun 2020 15:44)  T(F): 98.1 (04 Jun 2020 21:50), Max: 99.9 (04 Jun 2020 15:44)  HR: 82 (04 Jun 2020 21:50) (71 - 96)  BP: 108/60 (04 Jun 2020 21:50) (93/51 - 114/64)  BP(mean): --  RR: 13 (04 Jun 2020 21:50) (11 - 18)  SpO2: 100% (04 Jun 2020 21:50) (95% - 100%)    NECK: ANTERIOR NECK WOUND STRI STRIP IN PLACE, DRY AND INTACT.  NO EDEMA/ ECCHYMOSIS/ HEMATOMA/ TRACHEAL DEVIATION/ SC EMPHYSEMA          LUNGS: CLEAR TO AUSCULTATION , NO W/R/R                           ASSESSMENT &  PLAN:  POD # O S/P TOTAL THYROIDECTOMY    STABLE  D/C HOME  FOLLOW UP WITH DR. ELLIS

## 2020-06-04 NOTE — ASU DISCHARGE PLAN (ADULT/PEDIATRIC) - ASU DC SPECIAL INSTRUCTIONSFT
- Leave Steri-strips (tapes) on.  Some blood staining on the tape is normal.    - Okay to shower 48 hours after surgery (Saturday afternoon).  Keep showers short.  No baths or soaking the incision.  Remember to gently towel dry over the incision to avoid rubbing off the Steri-strips.    - Start gentle neck exercises after 72 hours (Sunday afternoon).  Look all the way to the right and left and then let your head roll all the way around.  Do this 10 times in a row at least 3 times a day.    - You may take Tylenol or Percocet for pain.  After 24 hours it is okay to take Ibuprofen.    - Remember that it is expected that your calcium level may be low after total thyroidectomy.  You should take around 1000 mg of over-the-counter calcium THREE TIMES DAILY starting tonight.  You may take any supplement that has 500-600 mg elemental calcium per pill.  If you begin experiencing symptoms of low calcium, such as numbness or tingling in your fingertips or around your mouth, immediately take an extra 1000 mg of elemental calcium.    - Take Synthroid ONCE DAILY as prescribed starting tomorrow morning.  Make sure to take your Synthroid first thing in the morning and on an empty stomach.  Avoid taking Synthroid within 1 hour of taking Calcium or other medications.

## 2020-06-05 DIAGNOSIS — Z87.440 PERSONAL HISTORY OF URINARY (TRACT) INFECTIONS: ICD-10-CM

## 2020-06-10 ENCOUNTER — APPOINTMENT (OUTPATIENT)
Dept: ENDOCRINOLOGY | Facility: CLINIC | Age: 40
End: 2020-06-10

## 2020-06-15 ENCOUNTER — APPOINTMENT (OUTPATIENT)
Dept: SURGERY | Facility: CLINIC | Age: 40
End: 2020-06-15
Payer: COMMERCIAL

## 2020-06-15 DIAGNOSIS — Z09 ENCOUNTER FOR FOLLOW-UP EXAMINATION AFTER COMPLETED TREATMENT FOR CONDITIONS OTHER THAN MALIGNANT NEOPLASM: ICD-10-CM

## 2020-06-15 PROCEDURE — 99024 POSTOP FOLLOW-UP VISIT: CPT

## 2020-06-15 NOTE — PHYSICAL EXAM
[Midline] : located in midline position [Normal] : orientation to person, place, and time: normal [de-identified] : Extremities: VILLALOBOS x 4.   Skin: No obvious skin lesions.   Voice: strong and clear

## 2020-06-18 ENCOUNTER — APPOINTMENT (OUTPATIENT)
Dept: ENDOCRINOLOGY | Facility: CLINIC | Age: 40
End: 2020-06-18
Payer: COMMERCIAL

## 2020-06-18 VITALS
BODY MASS INDEX: 23.3 KG/M2 | RESPIRATION RATE: 17 BRPM | SYSTOLIC BLOOD PRESSURE: 110 MMHG | OXYGEN SATURATION: 99 % | HEIGHT: 66 IN | DIASTOLIC BLOOD PRESSURE: 60 MMHG | WEIGHT: 145 LBS | HEART RATE: 85 BPM

## 2020-06-18 PROCEDURE — 36415 COLL VENOUS BLD VENIPUNCTURE: CPT

## 2020-06-18 PROCEDURE — 99214 OFFICE O/P EST MOD 30 MIN: CPT | Mod: 25

## 2020-06-18 NOTE — HISTORY OF PRESENT ILLNESS
[FreeTextEntry1] : 39 year female  f/u for management of thyroid cancer. \par \par *** Jun 18, 2020 ***\par \par s/p total thyroidectomy with right central neck dissection on 06/04/20\par Path:  multifocal PTC- RLP 2.5 cm and isthmus 1.0.  There was focal microscopic extrathyroidal extension into the perithyroidal adipose tissue with negative resection margins.  There was no lymphovascular invasion.  (+) 2/7 LN with the largest metastatic deposit measuring 0.1 cm.  \par *** [id number]\par \par discharged on Synthroid 125 mcg. Still on calcium 1200 mg bid.\par \par HPI:\par Mrs Hobson has accidentally discovered a "lump" on the right side of her neck. Subsequent thyroid sonogram showed a dominant right sided nodule. FNA (+) PTC.\par She denies history of neck surgery or radiation exposure to the neck area other than radiologic studies. \par Family history is negative for thyroid illness. \par Currently, there are no local neck symptoms of anterior neck pain or problems with breathing, speaking, or swallowing. \par Patient denies symptoms of hypothyroidism or hyperthyroidism. \par Labs: TSH- 1.7\par Thyroid US (4/7/20)- RMLP solid calcified 3.0x1.3x2.1 nodule, RUP 0.3x0.2x0.3\par FNAB (4/9/20) of the dominant right sided nodule - PTC ([place] [facility])\par \par Repeat Neck US (4/22/20) - bilateral level 2-3 LN with overall benign morphology\par \par

## 2020-06-18 NOTE — ASSESSMENT
[FreeTextEntry1] : Multifocal PTC. [id number]\par - s/p total thyroidectomy\par - Whole Body Scan (rh-TSH) about 4-6 weeks post surgery; followed  by I-131 remnant ablation therapy\par - Low-iodine diet is reviewed and [email or web address] information is provided \par - Levothyroxine suppressive therapy as well as Tg and Tg a/b monitoring were reviewed \par - Thyroid bed/anterior neck US ~ 6 months post surgery ( 12/20)\par - continue Synthroid 125 mcg, pending labs\par RTC ~ 3-4 wks post DAS\par \par

## 2020-06-18 NOTE — REASON FOR VISIT
[Hypothyroidism] : hypothyroidism [Follow - Up] : a follow-up visit [Thyroid Cancer] : thyroid cancer

## 2020-06-19 LAB
25(OH)D3 SERPL-MCNC: 42.1 NG/ML
ANION GAP SERPL CALC-SCNC: 16 MMOL/L
BUN SERPL-MCNC: 12 MG/DL
CA-I SERPL-SCNC: 1.37 MMOL/L
CALCIUM SERPL-MCNC: 9.6 MG/DL
CALCIUM SERPL-MCNC: 9.6 MG/DL
CHLORIDE SERPL-SCNC: 106 MMOL/L
CO2 SERPL-SCNC: 20 MMOL/L
CREAT SERPL-MCNC: 0.71 MG/DL
GLUCOSE SERPL-MCNC: 92 MG/DL
PARATHYROID HORMONE INTACT: 31 PG/ML
POTASSIUM SERPL-SCNC: 4.3 MMOL/L
SODIUM SERPL-SCNC: 141 MMOL/L
T4 FREE SERPL-MCNC: 2.2 NG/DL
THYROGLOB AB SERPL-ACNC: <20 IU/ML
THYROGLOB SERPL-MCNC: 18.5 NG/ML
TSH SERPL-ACNC: 0.03 UIU/ML

## 2020-07-20 ENCOUNTER — APPOINTMENT (OUTPATIENT)
Dept: NUCLEAR MEDICINE | Facility: HOSPITAL | Age: 40
End: 2020-07-20
Payer: COMMERCIAL

## 2020-07-20 ENCOUNTER — OUTPATIENT (OUTPATIENT)
Dept: OUTPATIENT SERVICES | Facility: HOSPITAL | Age: 40
LOS: 1 days | End: 2020-07-20
Payer: COMMERCIAL

## 2020-07-20 DIAGNOSIS — C73 MALIGNANT NEOPLASM OF THYROID GLAND: ICD-10-CM

## 2020-07-20 PROCEDURE — 99203 OFFICE O/P NEW LOW 30 MIN: CPT

## 2020-07-21 ENCOUNTER — APPOINTMENT (OUTPATIENT)
Dept: NUCLEAR MEDICINE | Facility: HOSPITAL | Age: 40
End: 2020-07-21

## 2020-07-22 ENCOUNTER — APPOINTMENT (OUTPATIENT)
Dept: NUCLEAR MEDICINE | Facility: HOSPITAL | Age: 40
End: 2020-07-22

## 2020-07-22 ENCOUNTER — RESULT REVIEW (OUTPATIENT)
Age: 40
End: 2020-07-22

## 2020-07-22 PROCEDURE — 79005 NUCLEAR RX ORAL ADMIN: CPT | Mod: 26

## 2020-07-28 ENCOUNTER — APPOINTMENT (OUTPATIENT)
Dept: NUCLEAR MEDICINE | Facility: HOSPITAL | Age: 40
End: 2020-07-28

## 2020-07-28 ENCOUNTER — RESULT REVIEW (OUTPATIENT)
Age: 40
End: 2020-07-28

## 2020-07-28 PROCEDURE — 78018 THYROID MET IMAGING BODY: CPT

## 2020-07-28 PROCEDURE — 78018 THYROID MET IMAGING BODY: CPT | Mod: 26

## 2020-07-28 PROCEDURE — A9517: CPT

## 2020-07-28 PROCEDURE — 79005 NUCLEAR RX ORAL ADMIN: CPT

## 2020-07-28 PROCEDURE — 96372 THER/PROPH/DIAG INJ SC/IM: CPT

## 2020-07-28 PROCEDURE — 78830 RP LOCLZJ TUM SPECT W/CT 1: CPT | Mod: 26

## 2020-07-28 PROCEDURE — 78830 RP LOCLZJ TUM SPECT W/CT 1: CPT

## 2020-07-28 PROCEDURE — 84702 CHORIONIC GONADOTROPIN TEST: CPT

## 2020-08-19 ENCOUNTER — APPOINTMENT (OUTPATIENT)
Dept: ENDOCRINOLOGY | Facility: CLINIC | Age: 40
End: 2020-08-19
Payer: COMMERCIAL

## 2020-08-19 VITALS
HEIGHT: 66 IN | WEIGHT: 145 LBS | RESPIRATION RATE: 16 BRPM | HEART RATE: 72 BPM | SYSTOLIC BLOOD PRESSURE: 110 MMHG | DIASTOLIC BLOOD PRESSURE: 60 MMHG | BODY MASS INDEX: 23.3 KG/M2 | OXYGEN SATURATION: 99 %

## 2020-08-19 LAB
25(OH)D3 SERPL-MCNC: 26.8 NG/ML
ALBUMIN SERPL ELPH-MCNC: 4.3 G/DL
CALCIUM SERPL-MCNC: 9.4 MG/DL
CALCIUM SERPL-MCNC: 9.4 MG/DL
PARATHYROID HORMONE INTACT: 43 PG/ML
T3 SERPL-MCNC: 112 NG/DL
T4 FREE SERPL-MCNC: 2.1 NG/DL
TSH SERPL-ACNC: 0.03 UIU/ML

## 2020-08-19 PROCEDURE — 36415 COLL VENOUS BLD VENIPUNCTURE: CPT

## 2020-08-19 PROCEDURE — 99214 OFFICE O/P EST MOD 30 MIN: CPT | Mod: 25

## 2020-08-19 RX ORDER — OMEPRAZOLE 40 MG/1
40 CAPSULE, DELAYED RELEASE ORAL
Qty: 30 | Refills: 0 | Status: DISCONTINUED | COMMUNITY
Start: 2020-01-02 | End: 2020-08-19

## 2020-08-19 RX ORDER — ALBUTEROL SULFATE 90 UG/1
108 (90 BASE) AEROSOL, METERED RESPIRATORY (INHALATION)
Qty: 1 | Refills: 5 | Status: DISCONTINUED | COMMUNITY
Start: 2020-01-06 | End: 2020-08-19

## 2020-08-19 RX ORDER — ALPRAZOLAM 0.5 MG/1
0.5 TABLET ORAL
Qty: 30 | Refills: 0 | Status: DISCONTINUED | COMMUNITY
Start: 2020-06-02 | End: 2020-08-19

## 2020-08-19 RX ORDER — FLUTICASONE PROPIONATE 50 UG/1
50 SPRAY, METERED NASAL TWICE DAILY
Qty: 1 | Refills: 5 | Status: DISCONTINUED | COMMUNITY
Start: 2020-01-06 | End: 2020-08-19

## 2020-08-19 RX ORDER — CIPROFLOXACIN HYDROCHLORIDE 500 MG/1
500 TABLET, FILM COATED ORAL
Qty: 6 | Refills: 0 | Status: DISCONTINUED | COMMUNITY
Start: 2020-06-05 | End: 2020-08-19

## 2020-08-19 RX ORDER — BUDESONIDE AND FORMOTEROL FUMARATE DIHYDRATE 160; 4.5 UG/1; UG/1
160-4.5 AEROSOL RESPIRATORY (INHALATION) TWICE DAILY
Qty: 1 | Refills: 5 | Status: DISCONTINUED | COMMUNITY
Start: 2020-01-22 | End: 2020-08-19

## 2020-08-19 RX ORDER — MONTELUKAST 10 MG/1
10 TABLET, FILM COATED ORAL
Qty: 30 | Refills: 3 | Status: DISCONTINUED | COMMUNITY
Start: 2020-01-06 | End: 2020-08-19

## 2020-08-19 NOTE — HISTORY OF PRESENT ILLNESS
[FreeTextEntry1] : 39 year female  f/u for management of thyroid cancer. \par \par *** Aug 19, 2020 ***\par \par feels tired, more hair loss. otherwise no new c/o\par on synthroid 125 mcg. off calcium. denies paraesthesia\par \par s/p I-131 with 100 mci (7/22/20) \par stim Tg- < 0.2 with Tg ab < 20\par \par post-treatment WBS 97/28/20)- no distant mts\par \par *** Jun 18, 2020 ***\par \par s/p total thyroidectomy with right central neck dissection on 06/04/20\par Path:  multifocal PTC- RLP 2.5 cm and isthmus 1.0.  There was focal microscopic extrathyroidal extension into the perithyroidal adipose tissue with negative resection margins.  There was no lymphovascular invasion.  (+) 2/7 LN with the largest metastatic deposit measuring 0.1 cm.  \par *** [id number]\par \par discharged on Synthroid 125 mcg. Still on calcium 1200 mg bid.\par \par HPI:\par Mrs Hobson has accidentally discovered a "lump" on the right side of her neck. Subsequent thyroid sonogram showed a dominant right sided nodule. FNA (+) PTC.\par She denies history of neck surgery or radiation exposure to the neck area other than radiologic studies. \par Family history is negative for thyroid illness. \par Currently, there are no local neck symptoms of anterior neck pain or problems with breathing, speaking, or swallowing. \par Patient denies symptoms of hypothyroidism or hyperthyroidism. \par Labs: TSH- 1.7\par Thyroid US (4/7/20)- RMLP solid calcified 3.0x1.3x2.1 nodule, RUP 0.3x0.2x0.3\par FNAB (4/9/20) of the dominant right sided nodule - PTC ([place] [facility])\par \par Repeat Neck US (4/22/20) - bilateral level 2-3 LN with overall benign morphology\par \par

## 2020-08-19 NOTE — ASSESSMENT
[FreeTextEntry1] : Multifocal PTC. [id number]\par - s/p total thyroidectomy and I-131 remnant ablation\par - Thyroid bed/anterior neck US ~ 6 months post surgery (12/20)\par - continue Synthroid 125 mcg, pending labs\par RTC 3 months\par \par

## 2020-08-20 LAB — CA-I SERPL-SCNC: 1.28 MMOL/L

## 2020-08-30 LAB — T4 FREE SERPL DIALY-MCNC: 2.3 NG/DL

## 2020-09-28 ENCOUNTER — RX RENEWAL (OUTPATIENT)
Age: 40
End: 2020-09-28

## 2020-11-19 ENCOUNTER — APPOINTMENT (OUTPATIENT)
Dept: ENDOCRINOLOGY | Facility: CLINIC | Age: 40
End: 2020-11-19
Payer: COMMERCIAL

## 2020-11-19 VITALS
BODY MASS INDEX: 23.63 KG/M2 | WEIGHT: 147 LBS | TEMPERATURE: 98.3 F | HEIGHT: 66 IN | OXYGEN SATURATION: 99 % | RESPIRATION RATE: 16 BRPM | DIASTOLIC BLOOD PRESSURE: 60 MMHG | HEART RATE: 70 BPM | SYSTOLIC BLOOD PRESSURE: 115 MMHG

## 2020-11-19 LAB
25(OH)D3 SERPL-MCNC: 21.3 NG/ML
ESTRADIOL SERPL-MCNC: 26 PG/ML
FSH SERPL-MCNC: 10.9 IU/L
HCG SERPL QL: NEGATIVE
LH SERPL-ACNC: 6.1 IU/L
PAPP-A SERPL-ACNC: <1 MIU/ML
PROLACTIN SERPL-MCNC: 14.1 NG/ML
T4 FREE SERPL-MCNC: 1.5 NG/DL
TSH SERPL-ACNC: 0.54 UIU/ML

## 2020-11-19 PROCEDURE — 36415 COLL VENOUS BLD VENIPUNCTURE: CPT

## 2020-11-19 PROCEDURE — 99214 OFFICE O/P EST MOD 30 MIN: CPT | Mod: 25

## 2020-11-19 NOTE — HISTORY OF PRESENT ILLNESS
[FreeTextEntry1] : 40 year female  f/u for management of thyroid cancer. \par \par *** Nov 19, 2020 ***\par \par on Synthroid 100 mcg\par feels better overall, no more hair loss, still very tired. Periods are less regular, getting twice a month now. wants to resume OCP's (prev took for many years)\par \par *** Aug 19, 2020 ***\par \par feels tired, more hair loss. otherwise no new c/o\par on synthroid 125 mcg. off calcium. denies paraesthesia\par \par s/p I-131 with 100 mci (7/22/20) \par stim Tg- < 0.2 with Tg ab < 20\par \par post-treatment WBS 97/28/20)- no distant mts\par \par *** Jun 18, 2020 ***\par \par s/p total thyroidectomy with right central neck dissection on 06/04/20\par Path:  multifocal PTC- RLP 2.5 cm and isthmus 1.0.  There was focal microscopic extrathyroidal extension into the perithyroidal adipose tissue with negative resection margins.  There was no lymphovascular invasion.  (+) 2/7 LN with the largest metastatic deposit measuring 0.1 cm.  \par *** [id number]\par \par discharged on Synthroid 125 mcg. Still on calcium 1200 mg bid.\par \par HPI:\par Mrs Hobson has accidentally discovered a "lump" on the right side of her neck. Subsequent thyroid sonogram showed a dominant right sided nodule. FNA (+) PTC.\par She denies history of neck surgery or radiation exposure to the neck area other than radiologic studies. \par Family history is negative for thyroid illness. \par Currently, there are no local neck symptoms of anterior neck pain or problems with breathing, speaking, or swallowing. \par Patient denies symptoms of hypothyroidism or hyperthyroidism. \par Labs: TSH- 1.7\par Thyroid US (4/7/20)- RMLP solid calcified 3.0x1.3x2.1 nodule, RUP 0.3x0.2x0.3\par FNAB (4/9/20) of the dominant right sided nodule - PTC ([place] [facility])\par \par Repeat Neck US (4/22/20) - bilateral level 2-3 LN with overall benign morphology\par \par

## 2020-11-19 NOTE — ASSESSMENT
[FreeTextEntry1] : Multifocal PTC. [id number]\par - s/p total thyroidectomy and I-131 remnant ablation\par - Thyroid bed/anterior neck US ~ 6 months post surgery (12/20)\par - continue Synthroid 100 mcg, pending labs. Potential options for adding T3 or switching to Tirosint or T3/T4 combo reviewed (R+B discussed) \par - start Loestrin 1/20. R+B reviewed\par RTC 3 months\par \par

## 2020-11-20 LAB
THYROGLOB AB SERPL-ACNC: <20 IU/ML
THYROGLOB SERPL-MCNC: <0.2 NG/ML

## 2020-12-23 PROBLEM — Z87.440 HISTORY OF URINARY TRACT INFECTION: Status: RESOLVED | Noted: 2020-06-05 | Resolved: 2020-12-23

## 2021-01-18 ENCOUNTER — RESULT REVIEW (OUTPATIENT)
Age: 41
End: 2021-01-18

## 2021-01-18 ENCOUNTER — APPOINTMENT (OUTPATIENT)
Dept: ULTRASOUND IMAGING | Facility: IMAGING CENTER | Age: 41
End: 2021-01-18
Payer: COMMERCIAL

## 2021-01-18 ENCOUNTER — APPOINTMENT (OUTPATIENT)
Dept: MAMMOGRAPHY | Facility: IMAGING CENTER | Age: 41
End: 2021-01-18
Payer: COMMERCIAL

## 2021-01-18 ENCOUNTER — OUTPATIENT (OUTPATIENT)
Dept: OUTPATIENT SERVICES | Facility: HOSPITAL | Age: 41
LOS: 1 days | End: 2021-01-18
Payer: COMMERCIAL

## 2021-01-18 DIAGNOSIS — Z01.419 ENCOUNTER FOR GYNECOLOGICAL EXAMINATION (GENERAL) (ROUTINE) WITHOUT ABNORMAL FINDINGS: ICD-10-CM

## 2021-01-18 DIAGNOSIS — Z00.8 ENCOUNTER FOR OTHER GENERAL EXAMINATION: ICD-10-CM

## 2021-01-18 DIAGNOSIS — C80.1 MALIGNANT (PRIMARY) NEOPLASM, UNSPECIFIED: ICD-10-CM

## 2021-01-18 PROCEDURE — 76536 US EXAM OF HEAD AND NECK: CPT | Mod: 26

## 2021-01-18 PROCEDURE — 76856 US EXAM PELVIC COMPLETE: CPT | Mod: 26

## 2021-01-18 PROCEDURE — 76830 TRANSVAGINAL US NON-OB: CPT | Mod: 26

## 2021-01-18 PROCEDURE — 77063 BREAST TOMOSYNTHESIS BI: CPT | Mod: 26

## 2021-01-18 PROCEDURE — 76641 ULTRASOUND BREAST COMPLETE: CPT | Mod: 26,50

## 2021-01-18 PROCEDURE — 77063 BREAST TOMOSYNTHESIS BI: CPT

## 2021-01-18 PROCEDURE — 76856 US EXAM PELVIC COMPLETE: CPT

## 2021-01-18 PROCEDURE — 77067 SCR MAMMO BI INCL CAD: CPT

## 2021-01-18 PROCEDURE — 76536 US EXAM OF HEAD AND NECK: CPT

## 2021-01-18 PROCEDURE — 76830 TRANSVAGINAL US NON-OB: CPT

## 2021-01-18 PROCEDURE — 77067 SCR MAMMO BI INCL CAD: CPT | Mod: 26

## 2021-01-18 PROCEDURE — 76641 ULTRASOUND BREAST COMPLETE: CPT

## 2021-01-20 ENCOUNTER — TRANSCRIPTION ENCOUNTER (OUTPATIENT)
Age: 41
End: 2021-01-20

## 2021-02-07 ENCOUNTER — RX RENEWAL (OUTPATIENT)
Age: 41
End: 2021-02-07

## 2021-03-03 ENCOUNTER — APPOINTMENT (OUTPATIENT)
Dept: ENDOCRINOLOGY | Facility: CLINIC | Age: 41
End: 2021-03-03
Payer: COMMERCIAL

## 2021-03-03 VITALS
WEIGHT: 147 LBS | HEIGHT: 66 IN | OXYGEN SATURATION: 99 % | RESPIRATION RATE: 16 BRPM | TEMPERATURE: 97.6 F | BODY MASS INDEX: 23.63 KG/M2 | SYSTOLIC BLOOD PRESSURE: 97 MMHG | DIASTOLIC BLOOD PRESSURE: 60 MMHG | HEART RATE: 77 BPM

## 2021-03-03 PROCEDURE — 99214 OFFICE O/P EST MOD 30 MIN: CPT | Mod: 25

## 2021-03-03 PROCEDURE — 99072 ADDL SUPL MATRL&STAF TM PHE: CPT

## 2021-03-03 PROCEDURE — 36415 COLL VENOUS BLD VENIPUNCTURE: CPT

## 2021-03-03 NOTE — ASSESSMENT
[FreeTextEntry1] : Multifocal PTC. [id number]\par - s/p total thyroidectomy and I-131 remnant ablation\par - Thyroid bed/anterior neck US in 1/22\par - continue Synthroid 100 mcg, pending labs. Potential options for adding T3 or switching to Tirosint or T3/T4 combo reviewed (R+B discussed) \par - WBS in 08/21\par - cont Loestrin 1/20. R+B reviewed\par RTC 3-6 months, depending on the results\par

## 2021-03-03 NOTE — HISTORY OF PRESENT ILLNESS
[FreeTextEntry1] : 40 year female  f/u for management of thyroid cancer. \par \par *** Mar 03, 2021 ***\par \par feels much better. Taking Loestrin 1/20. cycle is regular\par on Synthroid 100 mcg, vit D3 3000 IU/day\par Thyr bed/neck US (1/18/21)- s/p total tx. no LISA.\par \par *** Nov 19, 2020 ***\par \par on Synthroid 100 mcg\par feels better overall, no more hair loss, still very tired. Periods are less regular, getting twice a month now. wants to resume OCP's (prev took for many years)\par \par *** Aug 19, 2020 ***\par \par feels tired, more hair loss. otherwise no new c/o\par on synthroid 125 mcg. off calcium. denies paraesthesia\par \par s/p I-131 with 100 mci (7/22/20) \par stim Tg- < 0.2 with Tg ab < 20\par \par post-treatment WBS (7/28/20)- no distant mts\par \par *** Jun 18, 2020 ***\par \par s/p total thyroidectomy with right central neck dissection on 06/04/20\par Path:  multifocal PTC- RLP 2.5 cm and isthmus 1.0.  There was focal microscopic extrathyroidal extension into the perithyroidal adipose tissue with negative resection margins.  There was no lymphovascular invasion.  (+) 2/7 LN with the largest metastatic deposit measuring 0.1 cm.  \par *** [id number]\par \par discharged on Synthroid 125 mcg. Still on calcium 1200 mg bid.\par \par HPI:\par Mrs Hobson has accidentally discovered a "lump" on the right side of her neck. Subsequent thyroid sonogram showed a dominant right sided nodule. FNA (+) PTC.\par She denies history of neck surgery or radiation exposure to the neck area other than radiologic studies. \par Family history is negative for thyroid illness. \par Currently, there are no local neck symptoms of anterior neck pain or problems with breathing, speaking, or swallowing. \par Patient denies symptoms of hypothyroidism or hyperthyroidism. \par Labs: TSH- 1.7\par Thyroid US (4/7/20)- RMLP solid calcified 3.0x1.3x2.1 nodule, RUP 0.3x0.2x0.3\par FNAB (4/9/20) of the dominant right sided nodule - PTC ([place] [facility])\par \par Repeat Neck US (4/22/20) - bilateral level 2-3 LN with overall benign morphology\par \par

## 2021-03-03 NOTE — REASON FOR VISIT
sedated MRI [Follow - Up] : a follow-up visit [Hypothyroidism] : hypothyroidism [Thyroid Cancer] : thyroid cancer

## 2021-03-04 LAB
25(OH)D3 SERPL-MCNC: 35 NG/ML
ALBUMIN SERPL ELPH-MCNC: 4.3 G/DL
ALP BLD-CCNC: 74 U/L
ALT SERPL-CCNC: 12 U/L
ANION GAP SERPL CALC-SCNC: 12 MMOL/L
AST SERPL-CCNC: 14 U/L
BILIRUB SERPL-MCNC: 0.2 MG/DL
BUN SERPL-MCNC: 12 MG/DL
CALCIUM SERPL-MCNC: 9.5 MG/DL
CHLORIDE SERPL-SCNC: 106 MMOL/L
CO2 SERPL-SCNC: 22 MMOL/L
CREAT SERPL-MCNC: 0.93 MG/DL
GLUCOSE SERPL-MCNC: 110 MG/DL
MAGNESIUM SERPL-MCNC: 2.3 MG/DL
POTASSIUM SERPL-SCNC: 4.3 MMOL/L
PROT SERPL-MCNC: 7.2 G/DL
SODIUM SERPL-SCNC: 140 MMOL/L
T4 FREE SERPL-MCNC: 1.2 NG/DL
THYROGLOB AB SERPL-ACNC: <20 IU/ML
THYROGLOB SERPL-MCNC: <0.2 NG/ML
TSH SERPL-ACNC: 12.1 UIU/ML

## 2021-07-19 ENCOUNTER — NON-APPOINTMENT (OUTPATIENT)
Age: 41
End: 2021-07-19

## 2021-07-19 ENCOUNTER — APPOINTMENT (OUTPATIENT)
Dept: FAMILY MEDICINE | Facility: CLINIC | Age: 41
End: 2021-07-19
Payer: COMMERCIAL

## 2021-07-19 DIAGNOSIS — J45.909 UNSPECIFIED ASTHMA, UNCOMPLICATED: ICD-10-CM

## 2021-07-19 PROCEDURE — 99442: CPT

## 2021-07-19 NOTE — HEALTH RISK ASSESSMENT
[No falls in past year] : Patient reported no falls in the past year [0] : 2) Feeling down, depressed, or hopeless: Not at all (0) [Good] : ~his/her~  mood as  good [HIV test declined] : HIV test declined [Hepatitis C test declined] : Hepatitis C test declined [With Significant Other] : lives with significant other [With Family] : lives with family [Employed] : employed [] :  [# Of Children ___] : has [unfilled] children [Fully functional (bathing, dressing, toileting, transferring, walking, feeding)] : Fully functional (bathing, dressing, toileting, transferring, walking, feeding) [Fully functional (using the telephone, shopping, preparing meals, housekeeping, doing laundry, using] : Fully functional and needs no help or supervision to perform IADLs (using the telephone, shopping, preparing meals, housekeeping, doing laundry, using transportation, managing medications and managing finances) [Smoke Detector] : smoke detector [Seat Belt] :  uses seat belt [] : No [[id number]] : 0

## 2021-07-19 NOTE — HISTORY OF PRESENT ILLNESS
[Home] : at home, [unfilled] , at the time of the visit. [Medical Office: ([facility])___] : at the medical office located in  [Verbal consent obtained from patient] : the patient, [unfilled] [de-identified] : 39 year old female with urinary burning and frequency\par telephone visit, consent given\par 11 min\par

## 2021-08-09 ENCOUNTER — APPOINTMENT (OUTPATIENT)
Dept: FAMILY MEDICINE | Facility: CLINIC | Age: 41
End: 2021-08-09
Payer: COMMERCIAL

## 2021-08-09 VITALS
HEART RATE: 82 BPM | SYSTOLIC BLOOD PRESSURE: 114 MMHG | HEIGHT: 66 IN | DIASTOLIC BLOOD PRESSURE: 70 MMHG | TEMPERATURE: 97.6 F | OXYGEN SATURATION: 97 % | WEIGHT: 145 LBS | BODY MASS INDEX: 23.3 KG/M2

## 2021-08-09 DIAGNOSIS — M54.9 DORSALGIA, UNSPECIFIED: ICD-10-CM

## 2021-08-09 DIAGNOSIS — Z00.00 ENCOUNTER FOR GENERAL ADULT MEDICAL EXAMINATION W/OUT ABNORMAL FINDINGS: ICD-10-CM

## 2021-08-09 DIAGNOSIS — N39.0 URINARY TRACT INFECTION, SITE NOT SPECIFIED: ICD-10-CM

## 2021-08-09 LAB
BILIRUB UR QL STRIP: NORMAL
CLARITY UR: CLEAR
GLUCOSE UR-MCNC: NORMAL
HCG UR QL: 0.2 EU/DL
HGB UR QL STRIP.AUTO: NORMAL
KETONES UR-MCNC: NORMAL
LEUKOCYTE ESTERASE UR QL STRIP: NORMAL
NITRITE UR QL STRIP: NORMAL
PH UR STRIP: 5.5
PROT UR STRIP-MCNC: NORMAL
SP GR UR STRIP: 1.03

## 2021-08-09 PROCEDURE — 99213 OFFICE O/P EST LOW 20 MIN: CPT | Mod: 25

## 2021-08-09 PROCEDURE — 96372 THER/PROPH/DIAG INJ SC/IM: CPT

## 2021-08-09 PROCEDURE — 81002 URINALYSIS NONAUTO W/O SCOPE: CPT

## 2021-08-09 RX ORDER — KETOROLAC TROMETHAMINE 60 MG/2ML
60 INJECTION, SOLUTION INTRAMUSCULAR
Qty: 1 | Refills: 0 | Status: COMPLETED | OUTPATIENT
Start: 2021-08-09

## 2021-08-09 RX ORDER — CIPROFLOXACIN HYDROCHLORIDE 500 MG/1
500 TABLET, FILM COATED ORAL
Qty: 10 | Refills: 0 | Status: DISCONTINUED | COMMUNITY
Start: 2021-07-19 | End: 2021-08-09

## 2021-08-09 RX ORDER — PHENAZOPYRIDINE 100 MG/1
100 TABLET, FILM COATED ORAL 3 TIMES DAILY
Qty: 6 | Refills: 0 | Status: DISCONTINUED | COMMUNITY
Start: 2021-07-19 | End: 2021-08-09

## 2021-08-09 RX ADMIN — KETOROLAC TROMETHAMINE 0 MG/2ML: 60 INJECTION, SOLUTION INTRAMUSCULAR at 00:00

## 2021-08-09 NOTE — PHYSICAL EXAM
[Well Nourished] : well nourished [Well Developed] : well developed [Normal Gait] : normal gait [Normal Affect] : the affect was normal [Normal Insight/Judgement] : insight and judgment were intact [de-identified] : + CVA tenderness

## 2021-08-09 NOTE — ASSESSMENT
[FreeTextEntry1] : back pain\par suspect stone\par renal sonogram\par \par CT [place]\par toradol given\par \par fu culture\par fu sonogram

## 2021-08-09 NOTE — HISTORY OF PRESENT ILLNESS
[de-identified] : 40 year old female here with complaints of burning with urination. Patients active medications, allergies and issues were all reviewed with the patient at time of visit.\par

## 2021-08-09 NOTE — HEALTH RISK ASSESSMENT
[] : No [No] : No [No falls in past year] : Patient reported no falls in the past year [0] : 2) Feeling down, depressed, or hopeless: Not at all (0) [[id number]] : 0 [Good] : ~his/her~  mood as  good [HIV test declined] : HIV test declined [Hepatitis C test declined] : Hepatitis C test declined [With Significant Other] : lives with significant other [With Family] : lives with family [Employed] : employed [] :  [# Of Children ___] : has [unfilled] children [Fully functional (bathing, dressing, toileting, transferring, walking, feeding)] : Fully functional (bathing, dressing, toileting, transferring, walking, feeding) [Fully functional (using the telephone, shopping, preparing meals, housekeeping, doing laundry, using] : Fully functional and needs no help or supervision to perform IADLs (using the telephone, shopping, preparing meals, housekeeping, doing laundry, using transportation, managing medications and managing finances) [Smoke Detector] : smoke detector [Seat Belt] :  uses seat belt

## 2021-08-10 LAB — BACTERIA UR CULT: NORMAL

## 2021-09-13 ENCOUNTER — APPOINTMENT (OUTPATIENT)
Dept: ENDOCRINOLOGY | Facility: CLINIC | Age: 41
End: 2021-09-13
Payer: COMMERCIAL

## 2021-09-13 VITALS
SYSTOLIC BLOOD PRESSURE: 110 MMHG | TEMPERATURE: 98 F | OXYGEN SATURATION: 99 % | RESPIRATION RATE: 16 BRPM | HEIGHT: 66 IN | WEIGHT: 145 LBS | HEART RATE: 82 BPM | DIASTOLIC BLOOD PRESSURE: 60 MMHG | BODY MASS INDEX: 23.3 KG/M2

## 2021-09-13 LAB
T4 FREE SERPL-MCNC: 1.3 NG/DL
TSH SERPL-ACNC: 2.39 UIU/ML

## 2021-09-13 PROCEDURE — 99214 OFFICE O/P EST MOD 30 MIN: CPT | Mod: 25

## 2021-09-13 PROCEDURE — 36415 COLL VENOUS BLD VENIPUNCTURE: CPT

## 2021-09-13 NOTE — HISTORY OF PRESENT ILLNESS
[FreeTextEntry1] : 40 year female  f/u for management of thyroid cancer. \par \par *** Sep 13, 2021 ***\par \par feels well, starting a low iodine diet next week, followed by WBS\par on synthroid 125 mcg, did not repeat labs post dose change\par \par *** Mar 03, 2021 ***\par \par feels much better. Taking Loestrin 1/20. cycle is regular\par on Synthroid 100 mcg, vit D3 3000 IU/day\par Thyr bed/neck US (1/18/21)- s/p total tx. no LISA.\par \par *** Nov 19, 2020 ***\par \par on Synthroid 100 mcg\par feels better overall, no more hair loss, still very tired. Periods are less regular, getting twice a month now. wants to resume OCP's (prev took for many years)\par \par *** Aug 19, 2020 ***\par \par feels tired, more hair loss. otherwise no new c/o\par on synthroid 125 mcg. off calcium. denies paraesthesia\par \par s/p I-131 with 100 mci (7/22/20) \par stim Tg- < 0.2 with Tg ab < 20\par \par post-treatment WBS (7/28/20)- no distant mts\par \par *** Jun 18, 2020 ***\par \par s/p total thyroidectomy with right central neck dissection on 06/04/20\par Path:  multifocal PTC- RLP 2.5 cm and isthmus 1.0.  There was focal microscopic extrathyroidal extension into the perithyroidal adipose tissue with negative resection margins.  There was no lymphovascular invasion.  (+) 2/7 LN with the largest metastatic deposit measuring 0.1 cm.  \par *** [id number]\par \par discharged on Synthroid 125 mcg. Still on calcium 1200 mg bid.\par \par HPI:\par Mrs Hobson has accidentally discovered a "lump" on the right side of her neck. Subsequent thyroid sonogram showed a dominant right sided nodule. FNA (+) PTC.\par She denies history of neck surgery or radiation exposure to the neck area other than radiologic studies. \par Family history is negative for thyroid illness. \par Currently, there are no local neck symptoms of anterior neck pain or problems with breathing, speaking, or swallowing. \par Patient denies symptoms of hypothyroidism or hyperthyroidism. \par Labs: TSH- 1.7\par Thyroid US (4/7/20)- RMLP solid calcified 3.0x1.3x2.1 nodule, RUP 0.3x0.2x0.3\par FNAB (4/9/20) of the dominant right sided nodule - PTC ([place] [facility])\par \par Repeat Neck US (4/22/20) - bilateral level 2-3 LN with overall benign morphology\par \par

## 2021-09-13 NOTE — ASSESSMENT
[FreeTextEntry1] : Multifocal PTC. [id number]\par - s/p total thyroidectomy and I-131 remnant ablation\par - Thyroid bed/anterior neck US in 1/22\par - continue Synthroid 125 mcg, pending labs. Potential options for adding T3 or switching to Tirosint or T3/T4 combo reviewed (R+B discussed) \par - WBS next month\par - cont Loestrin 1/20. R+B reviewed\par RTC 3-4 months, depending on the results\par

## 2021-09-14 LAB
THYROGLOB AB SERPL-ACNC: <20 IU/ML
THYROGLOB SERPL-MCNC: <0.2 NG/ML

## 2021-10-04 ENCOUNTER — APPOINTMENT (OUTPATIENT)
Dept: NUCLEAR MEDICINE | Facility: HOSPITAL | Age: 41
End: 2021-10-04
Payer: COMMERCIAL

## 2021-10-04 ENCOUNTER — OUTPATIENT (OUTPATIENT)
Dept: OUTPATIENT SERVICES | Facility: HOSPITAL | Age: 41
LOS: 1 days | End: 2021-10-04
Payer: COMMERCIAL

## 2021-10-04 DIAGNOSIS — C73 MALIGNANT NEOPLASM OF THYROID GLAND: ICD-10-CM

## 2021-10-04 PROCEDURE — 99213 OFFICE O/P EST LOW 20 MIN: CPT

## 2021-10-05 ENCOUNTER — APPOINTMENT (OUTPATIENT)
Dept: NUCLEAR MEDICINE | Facility: HOSPITAL | Age: 41
End: 2021-10-05

## 2021-10-06 ENCOUNTER — RESULT REVIEW (OUTPATIENT)
Age: 41
End: 2021-10-06

## 2021-10-06 ENCOUNTER — APPOINTMENT (OUTPATIENT)
Dept: NUCLEAR MEDICINE | Facility: HOSPITAL | Age: 41
End: 2021-10-06

## 2021-10-08 ENCOUNTER — RESULT REVIEW (OUTPATIENT)
Age: 41
End: 2021-10-08

## 2021-10-08 ENCOUNTER — APPOINTMENT (OUTPATIENT)
Dept: NUCLEAR MEDICINE | Facility: HOSPITAL | Age: 41
End: 2021-10-08

## 2021-10-08 PROCEDURE — A9528: CPT

## 2021-10-08 PROCEDURE — 84702 CHORIONIC GONADOTROPIN TEST: CPT

## 2021-10-08 PROCEDURE — 78020 THYROID MET UPTAKE: CPT | Mod: 26

## 2021-10-08 PROCEDURE — 36415 COLL VENOUS BLD VENIPUNCTURE: CPT

## 2021-10-08 PROCEDURE — 78018 THYROID MET IMAGING BODY: CPT

## 2021-10-08 PROCEDURE — 96372 THER/PROPH/DIAG INJ SC/IM: CPT

## 2021-10-08 PROCEDURE — 78020 THYROID MET UPTAKE: CPT

## 2021-10-08 PROCEDURE — 78018 THYROID MET IMAGING BODY: CPT | Mod: 26

## 2021-10-09 ENCOUNTER — TRANSCRIPTION ENCOUNTER (OUTPATIENT)
Age: 41
End: 2021-10-09

## 2021-10-21 ENCOUNTER — RX RENEWAL (OUTPATIENT)
Age: 41
End: 2021-10-21

## 2021-12-04 ENCOUNTER — RX RENEWAL (OUTPATIENT)
Age: 41
End: 2021-12-04

## 2022-02-24 ENCOUNTER — APPOINTMENT (OUTPATIENT)
Dept: ENDOCRINOLOGY | Facility: CLINIC | Age: 42
End: 2022-02-24
Payer: COMMERCIAL

## 2022-02-24 VITALS
TEMPERATURE: 98.5 F | SYSTOLIC BLOOD PRESSURE: 110 MMHG | WEIGHT: 144 LBS | HEIGHT: 66 IN | OXYGEN SATURATION: 98 % | RESPIRATION RATE: 16 BRPM | BODY MASS INDEX: 23.14 KG/M2 | HEART RATE: 72 BPM | DIASTOLIC BLOOD PRESSURE: 60 MMHG

## 2022-02-24 LAB
T4 FREE SERPL-MCNC: 1.6 NG/DL
TSH SERPL-ACNC: 0.21 UIU/ML

## 2022-02-24 PROCEDURE — 99214 OFFICE O/P EST MOD 30 MIN: CPT | Mod: 25

## 2022-02-24 PROCEDURE — 36415 COLL VENOUS BLD VENIPUNCTURE: CPT

## 2022-02-24 RX ORDER — LEVOTHYROXINE SODIUM 125 UG/1
125 TABLET ORAL
Qty: 90 | Refills: 2 | Status: DISCONTINUED | COMMUNITY
Start: 2021-12-04 | End: 2022-02-24

## 2022-02-24 NOTE — ASSESSMENT
[FreeTextEntry1] : Multifocal PTC. [id number]\par - s/p total thyroidectomy and I-131 remnant ablation\par - Thyroid bed/anterior neck US this month, and if stable annually after\par - continue Synthroid 137 mcg, pending labs. Potential options for adding T3 or switching to Tirosint or T3/T4 combo reviewed (R+B discussed) \par - cont Loestrin 1/20. R+B reviewed\par RTC 3-6 months, depending on the results\par

## 2022-02-24 NOTE — HISTORY OF PRESENT ILLNESS
[FreeTextEntry1] : 41 year female  f/u for management of thyroid cancer. \par \par *** Feb 24, 2022 ***\par \par feels well, denies new c/o. hair thinning is better, no palpitations/panic attacks\par on Synthroid 137 mcg\par rh-TSH WBS (10/6/21)- uptake 0%. stim Tg < 0.2 with neg tg ab\par \par *** Sep 13, 2021 ***\par \par feels well, starting a low iodine diet next week, followed by WBS\par on synthroid 125 mcg, did not repeat labs post dose change\par \par *** Mar 03, 2021 ***\par \par feels much better. Taking Loestrin 1/20. cycle is regular\par on Synthroid 100 mcg, vit D3 3000 IU/day\par Thyr bed/neck US (1/18/21)- s/p total tx. no LISA.\par \par *** Nov 19, 2020 ***\par \par on Synthroid 100 mcg\par feels better overall, no more hair loss, still very tired. Periods are less regular, getting twice a month now. wants to resume OCP's (prev took for many years)\par \par *** Aug 19, 2020 ***\par \par feels tired, more hair loss. otherwise no new c/o\par on synthroid 125 mcg. off calcium. denies paraesthesia\par \par s/p I-131 with 100 mci (7/22/20) \par stim Tg- < 0.2 with Tg ab < 20\par \par post-treatment WBS (7/28/20)- no distant mts\par \par *** Jun 18, 2020 ***\par \par s/p total thyroidectomy with right central neck dissection on 06/04/20\par Path:  multifocal PTC- RLP 2.5 cm and isthmus 1.0.  There was focal microscopic extrathyroidal extension into the perithyroidal adipose tissue with negative resection margins.  There was no lymphovascular invasion.  (+) 2/7 LN with the largest metastatic deposit measuring 0.1 cm.  \par *** [id number]\par \par discharged on Synthroid 125 mcg. Still on calcium 1200 mg bid.\par \par HPI:\par Mrs Hobson has accidentally discovered a "lump" on the right side of her neck. Subsequent thyroid sonogram showed a dominant right sided nodule. FNA (+) PTC.\par She denies history of neck surgery or radiation exposure to the neck area other than radiologic studies. \par Family history is negative for thyroid illness. \par Currently, there are no local neck symptoms of anterior neck pain or problems with breathing, speaking, or swallowing. \par Patient denies symptoms of hypothyroidism or hyperthyroidism. \par Labs: TSH- 1.7\par Thyroid US (4/7/20)- RMLP solid calcified 3.0x1.3x2.1 nodule, RUP 0.3x0.2x0.3\par FNAB (4/9/20) of the dominant right sided nodule - PTC ([place] [facility])\par \par Repeat Neck US (4/22/20) - bilateral level 2-3 LN with overall benign morphology\par \par

## 2022-02-25 LAB
THYROGLOB AB SERPL-ACNC: <20 IU/ML
THYROGLOB SERPL-MCNC: <0.2 NG/ML

## 2022-04-11 PROBLEM — Z11.59 SCREENING FOR VIRAL DISEASE: Status: ACTIVE | Noted: 2020-06-02

## 2022-08-03 ENCOUNTER — OUTPATIENT (OUTPATIENT)
Dept: OUTPATIENT SERVICES | Facility: HOSPITAL | Age: 42
LOS: 1 days | End: 2022-08-03
Payer: COMMERCIAL

## 2022-08-03 ENCOUNTER — APPOINTMENT (OUTPATIENT)
Dept: ULTRASOUND IMAGING | Facility: IMAGING CENTER | Age: 42
End: 2022-08-03

## 2022-08-03 DIAGNOSIS — Z00.8 ENCOUNTER FOR OTHER GENERAL EXAMINATION: ICD-10-CM

## 2022-08-03 PROCEDURE — 76536 US EXAM OF HEAD AND NECK: CPT

## 2022-08-04 PROCEDURE — 76536 US EXAM OF HEAD AND NECK: CPT | Mod: 26

## 2022-08-15 ENCOUNTER — RX RENEWAL (OUTPATIENT)
Age: 42
End: 2022-08-15

## 2022-08-31 ENCOUNTER — APPOINTMENT (OUTPATIENT)
Dept: ENDOCRINOLOGY | Facility: CLINIC | Age: 42
End: 2022-08-31

## 2022-08-31 VITALS
SYSTOLIC BLOOD PRESSURE: 110 MMHG | RESPIRATION RATE: 16 BRPM | BODY MASS INDEX: 22.98 KG/M2 | WEIGHT: 143 LBS | OXYGEN SATURATION: 98 % | TEMPERATURE: 97.3 F | HEART RATE: 66 BPM | HEIGHT: 66 IN | DIASTOLIC BLOOD PRESSURE: 60 MMHG

## 2022-08-31 DIAGNOSIS — N92.6 IRREGULAR MENSTRUATION, UNSPECIFIED: ICD-10-CM

## 2022-08-31 PROCEDURE — 99214 OFFICE O/P EST MOD 30 MIN: CPT | Mod: 25

## 2022-08-31 PROCEDURE — 36415 COLL VENOUS BLD VENIPUNCTURE: CPT

## 2022-08-31 NOTE — HISTORY OF PRESENT ILLNESS
[FreeTextEntry1] : 41 year female  f/u for management of thyroid cancer. \par \par *** Aug 31, 2022 ***\par \par feels great. remains on the Synthroid 137 mcg, OCP's no anxiety/palpitations. \par no new c/o\par Thyr US (8/4/22)- s/p total tx. no LISA\par \par *** Feb 24, 2022 ***\par \par feels well, denies new c/o. hair thinning is better, no palpitations/panic attacks\par on Synthroid 137 mcg\par rh-TSH WBS (10/6/21)- uptake 0%. stim Tg < 0.2 with neg tg ab\par \par *** Sep 13, 2021 ***\par \par feels well, starting a low iodine diet next week, followed by WBS\par on synthroid 125 mcg, did not repeat labs post dose change\par \par *** Mar 03, 2021 ***\par \par feels much better. Taking Loestrin 1/20. cycle is regular\par on Synthroid 100 mcg, vit D3 3000 IU/day\par Thyr bed/neck US (1/18/21)- s/p total tx. no LISA.\par \par *** Nov 19, 2020 ***\par \par on Synthroid 100 mcg\par feels better overall, no more hair loss, still very tired. Periods are less regular, getting twice a month now. wants to resume OCP's (prev took for many years)\par \par *** Aug 19, 2020 ***\par \par feels tired, more hair loss. otherwise no new c/o\par on synthroid 125 mcg. off calcium. denies paraesthesia\par \par s/p I-131 with 100 mci (7/22/20) \par stim Tg- < 0.2 with Tg ab < 20\par \par post-treatment WBS (7/28/20)- no distant mts\par \par *** Jun 18, 2020 ***\par \par s/p total thyroidectomy with right central neck dissection on 06/04/20\par Path:  multifocal PTC- RLP 2.5 cm and isthmus 1.0.  There was focal microscopic extrathyroidal extension into the perithyroidal adipose tissue with negative resection margins.  There was no lymphovascular invasion.  (+) 2/7 LN with the largest metastatic deposit measuring 0.1 cm.  \par *** [id number]\par \par discharged on Synthroid 125 mcg. Still on calcium 1200 mg bid.\par \par HPI:\par Mrs Hobson has accidentally discovered a "lump" on the right side of her neck. Subsequent thyroid sonogram showed a dominant right sided nodule. FNA (+) PTC.\par She denies history of neck surgery or radiation exposure to the neck area other than radiologic studies. \par Family history is negative for thyroid illness. \par Currently, there are no local neck symptoms of anterior neck pain or problems with breathing, speaking, or swallowing. \par Patient denies symptoms of hypothyroidism or hyperthyroidism. \par Labs: TSH- 1.7\par Thyroid US (4/7/20)- RMLP solid calcified 3.0x1.3x2.1 nodule, RUP 0.3x0.2x0.3\par FNAB (4/9/20) of the dominant right sided nodule - PTC ([place] [facility])\par \par Repeat Neck US (4/22/20) - bilateral level 2-3 LN with overall benign morphology\par \par

## 2022-08-31 NOTE — ASSESSMENT
[FreeTextEntry1] : Multifocal PTC. [id number]\par - s/p total thyroidectomy and I-131 remnant ablation\par - repeat Thyroid bed/anterior neck US in 8/23\par - continue Synthroid 137 mcg, pending labs. Potential options for adding T3 or switching to Tirosint or T3/T4 combo reviewed (R+B discussed) \par - cont Loestrin 1/20. R+B reviewed\par RTC 6 months, or sooner prn\par

## 2022-09-01 ENCOUNTER — TRANSCRIPTION ENCOUNTER (OUTPATIENT)
Age: 42
End: 2022-09-01

## 2022-09-01 LAB
T4 FREE SERPL-MCNC: 1.4 NG/DL
THYROGLOB AB SERPL-ACNC: <20 IU/ML
THYROGLOB SERPL-MCNC: <0.2 NG/ML
TSH SERPL-ACNC: 1.68 UIU/ML

## 2022-10-21 ENCOUNTER — RX RENEWAL (OUTPATIENT)
Age: 42
End: 2022-10-21

## 2023-02-14 ENCOUNTER — APPOINTMENT (OUTPATIENT)
Dept: ENDOCRINOLOGY | Facility: CLINIC | Age: 43
End: 2023-02-14
Payer: COMMERCIAL

## 2023-02-14 VITALS
WEIGHT: 145 LBS | HEART RATE: 77 BPM | RESPIRATION RATE: 16 BRPM | DIASTOLIC BLOOD PRESSURE: 60 MMHG | TEMPERATURE: 97.3 F | BODY MASS INDEX: 23.3 KG/M2 | SYSTOLIC BLOOD PRESSURE: 118 MMHG | OXYGEN SATURATION: 99 % | HEIGHT: 66 IN

## 2023-02-14 PROCEDURE — 36415 COLL VENOUS BLD VENIPUNCTURE: CPT

## 2023-02-14 PROCEDURE — 99214 OFFICE O/P EST MOD 30 MIN: CPT | Mod: 25

## 2023-02-14 NOTE — HISTORY OF PRESENT ILLNESS
[FreeTextEntry1] : 42 year female  f/u for management of thyroid cancer. \par \par *** Feb 14, 2023 ***\par \par feels great. remains on the Synthroid 137 mcg, OCP's no anxiety/palpitations. \par no new c/o\par no recent labs. scheduled next month for anterior colporrhaphy, suburethral sling\par \par *** Aug 31, 2022 ***\par \par feels great. remains on the Synthroid 137 mcg, OCP's no anxiety/palpitations. \par no new c/o\par Thyr US (8/4/22)- s/p total tx. no LISA\par \par *** Feb 24, 2022 ***\par \par feels well, denies new c/o. hair thinning is better, no palpitations/panic attacks\par on Synthroid 137 mcg\par rh-TSH WBS (10/6/21)- uptake 0%. stim Tg < 0.2 with neg tg ab\par \par *** Sep 13, 2021 ***\par \par feels well, starting a low iodine diet next week, followed by WBS\par on synthroid 125 mcg, did not repeat labs post dose change\par \par *** Mar 03, 2021 ***\par \par feels much better. Taking Loestrin 1/20. cycle is regular\par on Synthroid 100 mcg, vit D3 3000 IU/day\par Thyr bed/neck US (1/18/21)- s/p total tx. no LISA.\par \par *** Nov 19, 2020 ***\par \par on Synthroid 100 mcg\par feels better overall, no more hair loss, still very tired. Periods are less regular, getting twice a month now. wants to resume OCP's (prev took for many years)\par \par *** Aug 19, 2020 ***\par \par feels tired, more hair loss. otherwise no new c/o\par on synthroid 125 mcg. off calcium. denies paraesthesia\par \par s/p I-131 with 100 mci (7/22/20) \par stim Tg- < 0.2 with Tg ab < 20\par \par post-treatment WBS (7/28/20)- no distant mts\par \par *** Jun 18, 2020 ***\par \par s/p total thyroidectomy with right central neck dissection on 06/04/20\par Path:  multifocal PTC- RLP 2.5 cm and isthmus 1.0.  There was focal microscopic extrathyroidal extension into the perithyroidal adipose tissue with negative resection margins.  There was no lymphovascular invasion.  (+) 2/7 LN with the largest metastatic deposit measuring 0.1 cm.  \par *** [id number]\par \par discharged on Synthroid 125 mcg. Still on calcium 1200 mg bid.\par \par HPI:\par Mrs Hobson has accidentally discovered a "lump" on the right side of her neck. Subsequent thyroid sonogram showed a dominant right sided nodule. FNA (+) PTC.\par She denies history of neck surgery or radiation exposure to the neck area other than radiologic studies. \par Family history is negative for thyroid illness. \par Currently, there are no local neck symptoms of anterior neck pain or problems with breathing, speaking, or swallowing. \par Patient denies symptoms of hypothyroidism or hyperthyroidism. \par Labs: TSH- 1.7\par Thyroid US (4/7/20)- RMLP solid calcified 3.0x1.3x2.1 nodule, RUP 0.3x0.2x0.3\par FNAB (4/9/20) of the dominant right sided nodule - PTC ([place] [facility])\par \par Repeat Neck US (4/22/20) - bilateral level 2-3 LN with overall benign morphology\par \par

## 2023-02-14 NOTE — ASSESSMENT
[FreeTextEntry1] : Multifocal PTC. [id number]\par - s/p total thyroidectomy and I-131 remnant ablation\par - repeat Thyroid bed/anterior neck US in 8/23\par - continue Synthroid 137 mcg, pending labs. Potential options for adding T3 or switching to Tirosint or T3/T4 combo reviewed (R+B discussed) \par - cont Loestrin 1/20. R+B reviewed\par RTC 6 months, or sooner prn. If all stable next visit\par

## 2023-02-16 LAB
T4 FREE SERPL-MCNC: 1 NG/DL
THYROGLOB AB SERPL-ACNC: <20 IU/ML
THYROGLOB SERPL-MCNC: <0.2 NG/ML
TSH SERPL-ACNC: 29.8 UIU/ML

## 2023-04-26 ENCOUNTER — APPOINTMENT (OUTPATIENT)
Dept: FAMILY MEDICINE | Facility: CLINIC | Age: 43
End: 2023-04-26

## 2023-08-17 ENCOUNTER — APPOINTMENT (OUTPATIENT)
Dept: ENDOCRINOLOGY | Facility: CLINIC | Age: 43
End: 2023-08-17

## 2023-09-13 ENCOUNTER — APPOINTMENT (OUTPATIENT)
Dept: FAMILY MEDICINE | Facility: CLINIC | Age: 43
End: 2023-09-13
Payer: COMMERCIAL

## 2023-09-13 ENCOUNTER — RESULT REVIEW (OUTPATIENT)
Age: 43
End: 2023-09-13

## 2023-09-13 VITALS
DIASTOLIC BLOOD PRESSURE: 68 MMHG | TEMPERATURE: 98.7 F | OXYGEN SATURATION: 98 % | SYSTOLIC BLOOD PRESSURE: 100 MMHG | HEART RATE: 75 BPM | BODY MASS INDEX: 22.82 KG/M2 | WEIGHT: 142 LBS | HEIGHT: 66 IN

## 2023-09-13 DIAGNOSIS — F41.9 ANXIETY DISORDER, UNSPECIFIED: ICD-10-CM

## 2023-09-13 PROCEDURE — 99214 OFFICE O/P EST MOD 30 MIN: CPT | Mod: 25

## 2023-09-13 RX ORDER — NORETHINDRONE ACETATE AND ETHINYL ESTRADIOL AND FERROUS FUMARATE 1MG-20(21)
1-20 KIT ORAL
Qty: 84 | Refills: 3 | Status: DISCONTINUED | COMMUNITY
Start: 2021-10-21 | End: 2023-09-13

## 2023-09-13 RX ORDER — NORETHINDRONE ACETATE AND ETHINYL ESTRADIOL AND FERROUS FUMARATE 1MG-20(21)
1-20 KIT ORAL DAILY
Qty: 3 | Refills: 3 | Status: DISCONTINUED | COMMUNITY
Start: 2020-11-19 | End: 2023-09-13

## 2023-09-13 RX ORDER — MUPIROCIN 20 MG/G
2 OINTMENT TOPICAL TWICE DAILY
Qty: 1 | Refills: 1 | Status: ACTIVE | COMMUNITY
Start: 2023-09-13 | End: 1900-01-01

## 2023-09-14 ENCOUNTER — LABORATORY RESULT (OUTPATIENT)
Age: 43
End: 2023-09-14

## 2023-09-14 ENCOUNTER — APPOINTMENT (OUTPATIENT)
Dept: ENDOCRINOLOGY | Facility: CLINIC | Age: 43
End: 2023-09-14
Payer: COMMERCIAL

## 2023-09-14 VITALS
WEIGHT: 142 LBS | BODY MASS INDEX: 22.82 KG/M2 | DIASTOLIC BLOOD PRESSURE: 70 MMHG | OXYGEN SATURATION: 98 % | HEIGHT: 66 IN | HEART RATE: 75 BPM | TEMPERATURE: 98.5 F | RESPIRATION RATE: 16 BRPM | SYSTOLIC BLOOD PRESSURE: 120 MMHG

## 2023-09-14 DIAGNOSIS — E89.0 POSTPROCEDURAL HYPOTHYROIDISM: ICD-10-CM

## 2023-09-14 DIAGNOSIS — R59.1 GENERALIZED ENLARGED LYMPH NODES: ICD-10-CM

## 2023-09-14 DIAGNOSIS — C73 MALIGNANT NEOPLASM OF THYROID GLAND: ICD-10-CM

## 2023-09-14 PROCEDURE — 99214 OFFICE O/P EST MOD 30 MIN: CPT | Mod: 25

## 2023-09-14 PROCEDURE — 36415 COLL VENOUS BLD VENIPUNCTURE: CPT

## 2023-09-15 ENCOUNTER — TRANSCRIPTION ENCOUNTER (OUTPATIENT)
Age: 43
End: 2023-09-15

## 2023-09-15 LAB
ALBUMIN SERPL ELPH-MCNC: 4.4 G/DL
ALP BLD-CCNC: 95 U/L
ALT SERPL-CCNC: 16 U/L
ANION GAP SERPL CALC-SCNC: 12 MMOL/L
AST SERPL-CCNC: 11 U/L
BILIRUB SERPL-MCNC: 0.2 MG/DL
BUN SERPL-MCNC: 13 MG/DL
CALCIUM SERPL-MCNC: 9.3 MG/DL
CHLORIDE SERPL-SCNC: 104 MMOL/L
CO2 SERPL-SCNC: 21 MMOL/L
CREAT SERPL-MCNC: 0.77 MG/DL
EGFR: 97 ML/MIN/1.73M2
ESTIMATED AVERAGE GLUCOSE: 111 MG/DL
GLUCOSE SERPL-MCNC: 98 MG/DL
HBA1C MFR BLD HPLC: 5.5 %
POTASSIUM SERPL-SCNC: 4 MMOL/L
PROT SERPL-MCNC: 7 G/DL
SODIUM SERPL-SCNC: 137 MMOL/L
T4 FREE SERPL-MCNC: 1.5 NG/DL
TSH SERPL-ACNC: 0.21 UIU/ML

## 2023-09-26 ENCOUNTER — TRANSCRIPTION ENCOUNTER (OUTPATIENT)
Age: 43
End: 2023-09-26

## 2023-09-26 ENCOUNTER — RESULT REVIEW (OUTPATIENT)
Age: 43
End: 2023-09-26

## 2023-09-26 ENCOUNTER — APPOINTMENT (OUTPATIENT)
Dept: ULTRASOUND IMAGING | Facility: CLINIC | Age: 43
End: 2023-09-26
Payer: COMMERCIAL

## 2023-09-26 ENCOUNTER — APPOINTMENT (OUTPATIENT)
Dept: MAMMOGRAPHY | Facility: CLINIC | Age: 43
End: 2023-09-26
Payer: COMMERCIAL

## 2023-09-26 DIAGNOSIS — M79.89 OTHER SPECIFIED SOFT TISSUE DISORDERS: ICD-10-CM

## 2023-09-26 DIAGNOSIS — R22.30 LOCALIZED SWELLING, MASS AND LUMP, UNSPECIFIED UPPER LIMB: ICD-10-CM

## 2023-09-26 PROCEDURE — 77066 DX MAMMO INCL CAD BI: CPT

## 2023-09-26 PROCEDURE — G0279: CPT

## 2023-09-26 PROCEDURE — 76536 US EXAM OF HEAD AND NECK: CPT

## 2023-09-26 PROCEDURE — 76641 ULTRASOUND BREAST COMPLETE: CPT | Mod: 50

## 2023-09-28 ENCOUNTER — TRANSCRIPTION ENCOUNTER (OUTPATIENT)
Age: 43
End: 2023-09-28

## 2024-03-04 RX ORDER — LEVOTHYROXINE SODIUM 175 UG/1
175 TABLET ORAL DAILY
Qty: 30 | Refills: 9 | Status: ACTIVE | COMMUNITY
Start: 2020-06-05 | End: 1900-01-01

## 2024-08-14 ENCOUNTER — APPOINTMENT (OUTPATIENT)
Dept: ENDOCRINOLOGY | Facility: CLINIC | Age: 44
End: 2024-08-14
Payer: COMMERCIAL

## 2024-08-14 VITALS
SYSTOLIC BLOOD PRESSURE: 111 MMHG | RESPIRATION RATE: 16 BRPM | BODY MASS INDEX: 22.98 KG/M2 | DIASTOLIC BLOOD PRESSURE: 72 MMHG | HEART RATE: 69 BPM | WEIGHT: 143 LBS | TEMPERATURE: 98.5 F | HEIGHT: 66 IN | OXYGEN SATURATION: 100 %

## 2024-08-14 DIAGNOSIS — C73 MALIGNANT NEOPLASM OF THYROID GLAND: ICD-10-CM

## 2024-08-14 DIAGNOSIS — E89.0 POSTPROCEDURAL HYPOTHYROIDISM: ICD-10-CM

## 2024-08-14 PROCEDURE — 99214 OFFICE O/P EST MOD 30 MIN: CPT

## 2024-08-14 PROCEDURE — 36415 COLL VENOUS BLD VENIPUNCTURE: CPT

## 2024-08-14 NOTE — ASSESSMENT
[FreeTextEntry1] : Multifocal PTC. [id number] - s/p total thyroidectomy and I-131 remnant ablation - repeat Thyroid bed/anterior neck US next month. Prev  palpable right neck LN could be reactive in nature, but will assess on the sonogram - continue Synthroid 175 mcg, pending labs. Potential options for adding T3 or switching to Tirosint or T3/T4 combo reviewed (R+B discussed)  If all stable, can f/u annually

## 2024-08-14 NOTE — HISTORY OF PRESENT ILLNESS
[FreeTextEntry1] : 43 year female  f/u for management of thyroid cancer.   *** Aug 14, 2024 ***  feels well, offers no new c.o weight has been stable, but unable to lose more. Exercising a lot, eating healthy cycle is regular, no hot flashes staying on Synthroid 175 mcg no recent labs  Thyroid ultrasound (9/26/2023)-status post total thyroidectomy.  The unremarkable thyroidectomy bed.  There is no cervical adenopathy.  *** Sep 14, 2023 ***  feels great, no new c/o staying on Synthroid 175 mcg. not on OCP's s/p urological sx- recovered well  *** Feb 14, 2023 ***  feels great. remains on the Synthroid 137 mcg, OCP's no anxiety/palpitations.  no new c/o no recent labs. scheduled next month for anterior colporrhaphy, suburethral sling  *** Aug 31, 2022 ***  feels great. remains on the Synthroid 137 mcg, OCP's no anxiety/palpitations.  no new c/o, except for a very recent h/o  right axillary swelling (thought related to a new deodorant). planned for mammo/ breast sono Thyr US (8/4/22)- s/p total tx. no LISA  *** Feb 24, 2022 ***  feels well, denies new c/o. hair thinning is better, no palpitations/panic attacks on Synthroid 137 mcg rh-TSH WBS (10/6/21)- uptake 0%. stim Tg < 0.2 with neg tg ab  *** Sep 13, 2021 ***  feels well, starting a low iodine diet next week, followed by WBS on synthroid 125 mcg, did not repeat labs post dose change  *** Mar 03, 2021 ***  feels much better. Taking Loestrin 1/20. cycle is regular on Synthroid 100 mcg, vit D3 3000 IU/day Thyr bed/neck US (1/18/21)- s/p total tx. no LISA.  *** Nov 19, 2020 ***  on Synthroid 100 mcg feels better overall, no more hair loss, still very tired. Periods are less regular, getting twice a month now. wants to resume OCP's (prev took for many years)  *** Aug 19, 2020 ***  feels tired, more hair loss. otherwise no new c/o on synthroid 125 mcg. off calcium. denies paraesthesia  s/p I-131 with 100 mci (7/22/20)  stim Tg- < 0.2 with Tg ab < 20  post-treatment WBS (7/28/20)- no distant mts  *** Jun 18, 2020 ***  s/p total thyroidectomy with right central neck dissection on 06/04/20 Path:  multifocal PTC- RLP 2.5 cm and isthmus 1.0.  There was focal microscopic extrathyroidal extension into the perithyroidal adipose tissue with negative resection margins.  There was no lymphovascular invasion.  (+) 2/7 LN with the largest metastatic deposit measuring 0.1 cm.   *** [id number]  discharged on Synthroid 125 mcg. Still on calcium 1200 mg bid.  HPI: Mrs Hobson has accidentally discovered a "lump" on the right side of her neck. Subsequent thyroid sonogram showed a dominant right sided nodule. FNA (+) PTC. She denies history of neck surgery or radiation exposure to the neck area other than radiologic studies.  Family history is negative for thyroid illness.  Currently, there are no local neck symptoms of anterior neck pain or problems with breathing, speaking, or swallowing.  Patient denies symptoms of hypothyroidism or hyperthyroidism.  Labs: TSH- 1.7 Thyroid US (4/7/20)- RMLP solid calcified 3.0x1.3x2.1 nodule, RUP 0.3x0.2x0.3 FNAB (4/9/20) of the dominant right sided nodule - PTC ([place] [facility])  Repeat Neck US (4/22/20) - bilateral level 2-3 LN with overall benign morphology

## 2024-08-15 ENCOUNTER — TRANSCRIPTION ENCOUNTER (OUTPATIENT)
Age: 44
End: 2024-08-15

## 2024-08-15 LAB
25(OH)D3 SERPL-MCNC: 36.8 NG/ML
ALBUMIN SERPL ELPH-MCNC: 4.3 G/DL
ALP BLD-CCNC: 97 U/L
ALT SERPL-CCNC: 19 U/L
ANION GAP SERPL CALC-SCNC: 12 MMOL/L
AST SERPL-CCNC: 9 U/L
BASOPHILS # BLD AUTO: 0.08 K/UL
BASOPHILS NFR BLD AUTO: 1 %
BILIRUB SERPL-MCNC: 0.2 MG/DL
BUN SERPL-MCNC: 12 MG/DL
CALCIUM SERPL-MCNC: 9.4 MG/DL
CHLORIDE SERPL-SCNC: 106 MMOL/L
CHOLEST SERPL-MCNC: 188 MG/DL
CO2 SERPL-SCNC: 22 MMOL/L
CREAT SERPL-MCNC: 0.86 MG/DL
EGFR: 85 ML/MIN/1.73M2
EOSINOPHIL # BLD AUTO: 0.11 K/UL
EOSINOPHIL NFR BLD AUTO: 1.4 %
ESTIMATED AVERAGE GLUCOSE: 108 MG/DL
GLUCOSE SERPL-MCNC: 96 MG/DL
HBA1C MFR BLD HPLC: 5.4 %
HCT VFR BLD CALC: 43.5 %
HDLC SERPL-MCNC: 41 MG/DL
HGB BLD-MCNC: 14 G/DL
IMM GRANULOCYTES NFR BLD AUTO: 0.1 %
LDLC SERPL CALC-MCNC: 108 MG/DL
LYMPHOCYTES # BLD AUTO: 3.01 K/UL
LYMPHOCYTES NFR BLD AUTO: 37.2 %
MAN DIFF?: NORMAL
MCHC RBC-ENTMCNC: 31.5 PG
MCHC RBC-ENTMCNC: 32.2 GM/DL
MCV RBC AUTO: 97.8 FL
MONOCYTES # BLD AUTO: 0.76 K/UL
MONOCYTES NFR BLD AUTO: 9.4 %
NEUTROPHILS # BLD AUTO: 4.13 K/UL
NEUTROPHILS NFR BLD AUTO: 50.9 %
NONHDLC SERPL-MCNC: 148 MG/DL
PLATELET # BLD AUTO: 285 K/UL
POTASSIUM SERPL-SCNC: 4.4 MMOL/L
PROT SERPL-MCNC: 6.8 G/DL
RBC # BLD: 4.45 M/UL
RBC # FLD: 13.2 %
SODIUM SERPL-SCNC: 139 MMOL/L
T4 FREE SERPL-MCNC: 1.8 NG/DL
THYROGLOB AB SERPL-ACNC: <20 IU/ML
THYROGLOB SERPL-MCNC: <0.2 NG/ML
TRIGL SERPL-MCNC: 231 MG/DL
TSH SERPL-ACNC: 0.05 UIU/ML
WBC # FLD AUTO: 8.1 K/UL

## 2024-08-16 ENCOUNTER — TRANSCRIPTION ENCOUNTER (OUTPATIENT)
Age: 44
End: 2024-08-16

## 2025-03-24 ENCOUNTER — TRANSCRIPTION ENCOUNTER (OUTPATIENT)
Age: 45
End: 2025-03-24